# Patient Record
Sex: FEMALE | Race: WHITE | NOT HISPANIC OR LATINO
[De-identification: names, ages, dates, MRNs, and addresses within clinical notes are randomized per-mention and may not be internally consistent; named-entity substitution may affect disease eponyms.]

---

## 2019-04-11 PROBLEM — Z00.00 ENCOUNTER FOR PREVENTIVE HEALTH EXAMINATION: Status: ACTIVE | Noted: 2019-04-11

## 2019-04-12 ENCOUNTER — APPOINTMENT (OUTPATIENT)
Dept: MRI IMAGING | Facility: CLINIC | Age: 84
End: 2019-04-12
Payer: MEDICARE

## 2019-04-12 ENCOUNTER — OUTPATIENT (OUTPATIENT)
Dept: OUTPATIENT SERVICES | Facility: HOSPITAL | Age: 84
LOS: 1 days | End: 2019-04-12
Payer: MEDICARE

## 2019-04-12 DIAGNOSIS — Z00.8 ENCOUNTER FOR OTHER GENERAL EXAMINATION: ICD-10-CM

## 2019-04-12 PROCEDURE — 72148 MRI LUMBAR SPINE W/O DYE: CPT

## 2019-04-12 PROCEDURE — 72148 MRI LUMBAR SPINE W/O DYE: CPT | Mod: 26

## 2019-05-22 ENCOUNTER — APPOINTMENT (OUTPATIENT)
Dept: MRI IMAGING | Facility: CLINIC | Age: 84
End: 2019-05-22

## 2019-05-22 ENCOUNTER — OUTPATIENT (OUTPATIENT)
Dept: OUTPATIENT SERVICES | Facility: HOSPITAL | Age: 84
LOS: 1 days | End: 2019-05-22
Payer: MEDICARE

## 2019-05-22 DIAGNOSIS — Z00.00 ENCOUNTER FOR GENERAL ADULT MEDICAL EXAMINATION WITHOUT ABNORMAL FINDINGS: ICD-10-CM

## 2019-05-22 PROCEDURE — 72141 MRI NECK SPINE W/O DYE: CPT

## 2019-05-22 PROCEDURE — 72141 MRI NECK SPINE W/O DYE: CPT | Mod: 26

## 2021-01-08 ENCOUNTER — EMERGENCY (EMERGENCY)
Facility: HOSPITAL | Age: 86
LOS: 1 days | Discharge: HOME | End: 2021-01-08
Attending: EMERGENCY MEDICINE
Payer: MEDICARE

## 2021-01-08 VITALS
OXYGEN SATURATION: 99 % | RESPIRATION RATE: 18 BRPM | HEIGHT: 65 IN | SYSTOLIC BLOOD PRESSURE: 144 MMHG | TEMPERATURE: 98 F | WEIGHT: 149.91 LBS | HEART RATE: 106 BPM | DIASTOLIC BLOOD PRESSURE: 55 MMHG

## 2021-01-08 VITALS
TEMPERATURE: 98 F | RESPIRATION RATE: 18 BRPM | HEART RATE: 80 BPM | SYSTOLIC BLOOD PRESSURE: 137 MMHG | DIASTOLIC BLOOD PRESSURE: 67 MMHG | OXYGEN SATURATION: 98 %

## 2021-01-08 LAB
ALBUMIN SERPL ELPH-MCNC: 4 G/DL — SIGNIFICANT CHANGE UP (ref 3.3–5.2)
ALP SERPL-CCNC: 88 U/L — SIGNIFICANT CHANGE UP (ref 40–120)
ALT FLD-CCNC: 18 U/L — SIGNIFICANT CHANGE UP
ANION GAP SERPL CALC-SCNC: 12 MMOL/L — SIGNIFICANT CHANGE UP (ref 5–17)
AST SERPL-CCNC: 24 U/L — SIGNIFICANT CHANGE UP
BASOPHILS # BLD AUTO: 0.08 K/UL — SIGNIFICANT CHANGE UP (ref 0–0.2)
BASOPHILS NFR BLD AUTO: 1 % — SIGNIFICANT CHANGE UP (ref 0–2)
BILIRUB SERPL-MCNC: 0.8 MG/DL — SIGNIFICANT CHANGE UP (ref 0.4–2)
BUN SERPL-MCNC: 15 MG/DL — SIGNIFICANT CHANGE UP (ref 8–20)
CALCIUM SERPL-MCNC: 10.8 MG/DL — HIGH (ref 8.6–10.2)
CHLORIDE SERPL-SCNC: 102 MMOL/L — SIGNIFICANT CHANGE UP (ref 98–107)
CK SERPL-CCNC: 106 U/L — SIGNIFICANT CHANGE UP (ref 25–170)
CO2 SERPL-SCNC: 28 MMOL/L — SIGNIFICANT CHANGE UP (ref 22–29)
CREAT SERPL-MCNC: 1 MG/DL — SIGNIFICANT CHANGE UP (ref 0.5–1.3)
EOSINOPHIL # BLD AUTO: 0.49 K/UL — SIGNIFICANT CHANGE UP (ref 0–0.5)
EOSINOPHIL NFR BLD AUTO: 6.1 % — HIGH (ref 0–6)
GLUCOSE SERPL-MCNC: 114 MG/DL — HIGH (ref 70–99)
HCT VFR BLD CALC: 41.8 % — SIGNIFICANT CHANGE UP (ref 34.5–45)
HGB BLD-MCNC: 13.6 G/DL — SIGNIFICANT CHANGE UP (ref 11.5–15.5)
IMM GRANULOCYTES NFR BLD AUTO: 0.1 % — SIGNIFICANT CHANGE UP (ref 0–1.5)
LIDOCAIN IGE QN: 35 U/L — SIGNIFICANT CHANGE UP (ref 22–51)
LYMPHOCYTES # BLD AUTO: 2.06 K/UL — SIGNIFICANT CHANGE UP (ref 1–3.3)
LYMPHOCYTES # BLD AUTO: 25.8 % — SIGNIFICANT CHANGE UP (ref 13–44)
MCHC RBC-ENTMCNC: 30.2 PG — SIGNIFICANT CHANGE UP (ref 27–34)
MCHC RBC-ENTMCNC: 32.5 GM/DL — SIGNIFICANT CHANGE UP (ref 32–36)
MCV RBC AUTO: 92.9 FL — SIGNIFICANT CHANGE UP (ref 80–100)
MONOCYTES # BLD AUTO: 0.55 K/UL — SIGNIFICANT CHANGE UP (ref 0–0.9)
MONOCYTES NFR BLD AUTO: 6.9 % — SIGNIFICANT CHANGE UP (ref 2–14)
NEUTROPHILS # BLD AUTO: 4.78 K/UL — SIGNIFICANT CHANGE UP (ref 1.8–7.4)
NEUTROPHILS NFR BLD AUTO: 60.1 % — SIGNIFICANT CHANGE UP (ref 43–77)
PLATELET # BLD AUTO: 245 K/UL — SIGNIFICANT CHANGE UP (ref 150–400)
POTASSIUM SERPL-MCNC: 4 MMOL/L — SIGNIFICANT CHANGE UP (ref 3.5–5.3)
POTASSIUM SERPL-SCNC: 4 MMOL/L — SIGNIFICANT CHANGE UP (ref 3.5–5.3)
PROT SERPL-MCNC: 7.4 G/DL — SIGNIFICANT CHANGE UP (ref 6.6–8.7)
RBC # BLD: 4.5 M/UL — SIGNIFICANT CHANGE UP (ref 3.8–5.2)
RBC # FLD: 12.6 % — SIGNIFICANT CHANGE UP (ref 10.3–14.5)
SARS-COV-2 RNA SPEC QL NAA+PROBE: SIGNIFICANT CHANGE UP
SODIUM SERPL-SCNC: 142 MMOL/L — SIGNIFICANT CHANGE UP (ref 135–145)
TROPONIN T SERPL-MCNC: <0.01 NG/ML — SIGNIFICANT CHANGE UP (ref 0–0.06)
TROPONIN T SERPL-MCNC: <0.01 NG/ML — SIGNIFICANT CHANGE UP (ref 0–0.06)
WBC # BLD: 7.97 K/UL — SIGNIFICANT CHANGE UP (ref 3.8–10.5)
WBC # FLD AUTO: 7.97 K/UL — SIGNIFICANT CHANGE UP (ref 3.8–10.5)

## 2021-01-08 PROCEDURE — 83690 ASSAY OF LIPASE: CPT

## 2021-01-08 PROCEDURE — 99285 EMERGENCY DEPT VISIT HI MDM: CPT

## 2021-01-08 PROCEDURE — 93005 ELECTROCARDIOGRAM TRACING: CPT

## 2021-01-08 PROCEDURE — 96375 TX/PRO/DX INJ NEW DRUG ADDON: CPT

## 2021-01-08 PROCEDURE — 82550 ASSAY OF CK (CPK): CPT

## 2021-01-08 PROCEDURE — U0003: CPT

## 2021-01-08 PROCEDURE — U0005: CPT

## 2021-01-08 PROCEDURE — 99284 EMERGENCY DEPT VISIT MOD MDM: CPT | Mod: 25

## 2021-01-08 PROCEDURE — 80053 COMPREHEN METABOLIC PANEL: CPT

## 2021-01-08 PROCEDURE — 96374 THER/PROPH/DIAG INJ IV PUSH: CPT

## 2021-01-08 PROCEDURE — 36415 COLL VENOUS BLD VENIPUNCTURE: CPT

## 2021-01-08 PROCEDURE — 71045 X-RAY EXAM CHEST 1 VIEW: CPT

## 2021-01-08 PROCEDURE — 93010 ELECTROCARDIOGRAM REPORT: CPT

## 2021-01-08 PROCEDURE — 84484 ASSAY OF TROPONIN QUANT: CPT

## 2021-01-08 PROCEDURE — 71045 X-RAY EXAM CHEST 1 VIEW: CPT | Mod: 26

## 2021-01-08 PROCEDURE — 85025 COMPLETE CBC W/AUTO DIFF WBC: CPT

## 2021-01-08 RX ORDER — ONDANSETRON 8 MG/1
4 TABLET, FILM COATED ORAL ONCE
Refills: 0 | Status: COMPLETED | OUTPATIENT
Start: 2021-01-08 | End: 2021-01-08

## 2021-01-08 RX ORDER — SUCRALFATE 1 G
1 TABLET ORAL
Qty: 120 | Refills: 0
Start: 2021-01-08 | End: 2021-02-06

## 2021-01-08 RX ORDER — PANTOPRAZOLE SODIUM 20 MG/1
1 TABLET, DELAYED RELEASE ORAL
Qty: 30 | Refills: 0
Start: 2021-01-08 | End: 2021-02-06

## 2021-01-08 RX ORDER — FAMOTIDINE 10 MG/ML
20 INJECTION INTRAVENOUS ONCE
Refills: 0 | Status: COMPLETED | OUTPATIENT
Start: 2021-01-08 | End: 2021-01-08

## 2021-01-08 RX ADMIN — Medication 30 MILLILITER(S): at 13:00

## 2021-01-08 RX ADMIN — FAMOTIDINE 20 MILLIGRAM(S): 10 INJECTION INTRAVENOUS at 13:00

## 2021-01-08 RX ADMIN — ONDANSETRON 4 MILLIGRAM(S): 8 TABLET, FILM COATED ORAL at 13:00

## 2021-01-08 NOTE — ED PROVIDER NOTE - CLINICAL SUMMARY MEDICAL DECISION MAKING FREE TEXT BOX
pt has epigastric pain for 3 days, will treat as gastritis, r/o acs, pna with 1 set of trop, cxr and reassess

## 2021-01-08 NOTE — ED PROVIDER NOTE - OBJECTIVE STATEMENT
86 y/o F with h/o hypothytroidism, gerd, anxiety p/w epigastric pain for 3 days which is burning in nature is very similar to her prior gerd episodes but persisten. Her GI was not available so she went to urgent care and was sent to ED for eval. No cough, fever, chills, mausea, vomiting, diarrhea, sob

## 2021-01-08 NOTE — ED PROVIDER NOTE - PROGRESS NOTE DETAILS
pt felt better, 2 set of neg trop likely gerd, pt currrently no on my maed for gerd per her med list, will start on pantocid

## 2021-01-08 NOTE — ED PROVIDER NOTE - PATIENT PORTAL LINK FT
You can access the FollowMyHealth Patient Portal offered by [facility] by registering at the following website: [email or web address]. By joining [employer]’s FollowMyHealth portal, you will also be able to view your health information using other applications (apps) compatible with our system.

## 2021-01-08 NOTE — ED ADULT NURSE NOTE - OBJECTIVE STATEMENT
pt A&Ox4, sent from urgent care to r/o MI, c/o burning in chest similar to her acid reflux just worsen x4days pt taking TUMS with no relief, resp even and unlabored no distress noted, denies numbness/tingling, sob, cough, fever/chills, ha, dizziness, abd pain n/v/d

## 2021-01-08 NOTE — ED PROVIDER NOTE - NSFOLLOWUPINSTRUCTIONS_ED_ALL_ED_FT
1. take meds as prescribed  2. avoid spicy, fried food, tea, cofee  2. follow up with your GI  4. DON'T LAY DOWN RIGHT AFTER eating for 30 minutes  5. return promptly in c/o worsening symptoms

## 2021-01-09 RX ORDER — ATORVASTATIN CALCIUM 80 MG/1
1 TABLET, FILM COATED ORAL
Qty: 0 | Refills: 0 | DISCHARGE

## 2021-01-09 RX ORDER — LEVOTHYROXINE SODIUM 125 MCG
1 TABLET ORAL
Qty: 0 | Refills: 0 | DISCHARGE

## 2021-01-09 RX ORDER — PANTOPRAZOLE SODIUM 20 MG/1
1 TABLET, DELAYED RELEASE ORAL
Qty: 0 | Refills: 0 | DISCHARGE

## 2021-01-09 RX ORDER — VENLAFAXINE HCL 75 MG
1 CAPSULE, EXT RELEASE 24 HR ORAL
Qty: 0 | Refills: 0 | DISCHARGE

## 2021-11-07 ENCOUNTER — EMERGENCY (EMERGENCY)
Facility: HOSPITAL | Age: 86
LOS: 1 days | Discharge: HOME | End: 2021-11-07
Attending: EMERGENCY MEDICINE
Payer: MEDICARE

## 2021-11-07 VITALS
RESPIRATION RATE: 20 BRPM | DIASTOLIC BLOOD PRESSURE: 93 MMHG | OXYGEN SATURATION: 99 % | SYSTOLIC BLOOD PRESSURE: 168 MMHG | HEART RATE: 81 BPM

## 2021-11-07 VITALS
DIASTOLIC BLOOD PRESSURE: 88 MMHG | HEIGHT: 65 IN | OXYGEN SATURATION: 95 % | RESPIRATION RATE: 16 BRPM | HEART RATE: 93 BPM | WEIGHT: 141.98 LBS | SYSTOLIC BLOOD PRESSURE: 188 MMHG | TEMPERATURE: 99 F

## 2021-11-07 PROBLEM — E03.9 HYPOTHYROIDISM, UNSPECIFIED: Chronic | Status: ACTIVE | Noted: 2021-01-08

## 2021-11-07 PROBLEM — F32.9 MAJOR DEPRESSIVE DISORDER, SINGLE EPISODE, UNSPECIFIED: Chronic | Status: ACTIVE | Noted: 2021-01-08

## 2021-11-07 PROBLEM — E78.00 PURE HYPERCHOLESTEROLEMIA, UNSPECIFIED: Chronic | Status: ACTIVE | Noted: 2021-01-08

## 2021-11-07 LAB
ALBUMIN SERPL ELPH-MCNC: 4 G/DL — SIGNIFICANT CHANGE UP (ref 3.3–5.2)
ALP SERPL-CCNC: 96 U/L — SIGNIFICANT CHANGE UP (ref 40–120)
ALT FLD-CCNC: 26 U/L — SIGNIFICANT CHANGE UP
ANION GAP SERPL CALC-SCNC: 10 MMOL/L — SIGNIFICANT CHANGE UP (ref 5–17)
AST SERPL-CCNC: 28 U/L — SIGNIFICANT CHANGE UP
BASOPHILS # BLD AUTO: 0.04 K/UL — SIGNIFICANT CHANGE UP (ref 0–0.2)
BASOPHILS NFR BLD AUTO: 0.8 % — SIGNIFICANT CHANGE UP (ref 0–2)
BILIRUB SERPL-MCNC: 0.6 MG/DL — SIGNIFICANT CHANGE UP (ref 0.4–2)
BUN SERPL-MCNC: 9.4 MG/DL — SIGNIFICANT CHANGE UP (ref 8–20)
CALCIUM SERPL-MCNC: 8.3 MG/DL — LOW (ref 8.6–10.2)
CHLORIDE SERPL-SCNC: 104 MMOL/L — SIGNIFICANT CHANGE UP (ref 98–107)
CO2 SERPL-SCNC: 27 MMOL/L — SIGNIFICANT CHANGE UP (ref 22–29)
CREAT SERPL-MCNC: 0.82 MG/DL — SIGNIFICANT CHANGE UP (ref 0.5–1.3)
EOSINOPHIL # BLD AUTO: 0.08 K/UL — SIGNIFICANT CHANGE UP (ref 0–0.5)
EOSINOPHIL NFR BLD AUTO: 1.5 % — SIGNIFICANT CHANGE UP (ref 0–6)
GLUCOSE SERPL-MCNC: 106 MG/DL — HIGH (ref 70–99)
HCT VFR BLD CALC: 41.4 % — SIGNIFICANT CHANGE UP (ref 34.5–45)
HGB BLD-MCNC: 13.3 G/DL — SIGNIFICANT CHANGE UP (ref 11.5–15.5)
HPIV2 RNA SPEC QL NAA+PROBE: DETECTED
IMM GRANULOCYTES NFR BLD AUTO: 0.4 % — SIGNIFICANT CHANGE UP (ref 0–1.5)
LYMPHOCYTES # BLD AUTO: 1.47 K/UL — SIGNIFICANT CHANGE UP (ref 1–3.3)
LYMPHOCYTES # BLD AUTO: 27.9 % — SIGNIFICANT CHANGE UP (ref 13–44)
MCHC RBC-ENTMCNC: 29.4 PG — SIGNIFICANT CHANGE UP (ref 27–34)
MCHC RBC-ENTMCNC: 32.1 GM/DL — SIGNIFICANT CHANGE UP (ref 32–36)
MCV RBC AUTO: 91.6 FL — SIGNIFICANT CHANGE UP (ref 80–100)
MONOCYTES # BLD AUTO: 0.31 K/UL — SIGNIFICANT CHANGE UP (ref 0–0.9)
MONOCYTES NFR BLD AUTO: 5.9 % — SIGNIFICANT CHANGE UP (ref 2–14)
NEUTROPHILS # BLD AUTO: 3.35 K/UL — SIGNIFICANT CHANGE UP (ref 1.8–7.4)
NEUTROPHILS NFR BLD AUTO: 63.5 % — SIGNIFICANT CHANGE UP (ref 43–77)
PLATELET # BLD AUTO: 233 K/UL — SIGNIFICANT CHANGE UP (ref 150–400)
POTASSIUM SERPL-MCNC: 4.2 MMOL/L — SIGNIFICANT CHANGE UP (ref 3.5–5.3)
POTASSIUM SERPL-SCNC: 4.2 MMOL/L — SIGNIFICANT CHANGE UP (ref 3.5–5.3)
PROT SERPL-MCNC: 7.5 G/DL — SIGNIFICANT CHANGE UP (ref 6.6–8.7)
RAPID RVP RESULT: DETECTED
RBC # BLD: 4.52 M/UL — SIGNIFICANT CHANGE UP (ref 3.8–5.2)
RBC # FLD: 12.2 % — SIGNIFICANT CHANGE UP (ref 10.3–14.5)
SARS-COV-2 RNA SPEC QL NAA+PROBE: SIGNIFICANT CHANGE UP
SODIUM SERPL-SCNC: 141 MMOL/L — SIGNIFICANT CHANGE UP (ref 135–145)
TROPONIN T SERPL-MCNC: <0.01 NG/ML — SIGNIFICANT CHANGE UP (ref 0–0.06)
WBC # BLD: 5.27 K/UL — SIGNIFICANT CHANGE UP (ref 3.8–10.5)
WBC # FLD AUTO: 5.27 K/UL — SIGNIFICANT CHANGE UP (ref 3.8–10.5)

## 2021-11-07 PROCEDURE — 0225U NFCT DS DNA&RNA 21 SARSCOV2: CPT

## 2021-11-07 PROCEDURE — 80053 COMPREHEN METABOLIC PANEL: CPT

## 2021-11-07 PROCEDURE — 99283 EMERGENCY DEPT VISIT LOW MDM: CPT | Mod: 25

## 2021-11-07 PROCEDURE — 71045 X-RAY EXAM CHEST 1 VIEW: CPT | Mod: 26

## 2021-11-07 PROCEDURE — 36415 COLL VENOUS BLD VENIPUNCTURE: CPT

## 2021-11-07 PROCEDURE — 85025 COMPLETE CBC W/AUTO DIFF WBC: CPT

## 2021-11-07 PROCEDURE — 99285 EMERGENCY DEPT VISIT HI MDM: CPT | Mod: CS

## 2021-11-07 PROCEDURE — 84484 ASSAY OF TROPONIN QUANT: CPT

## 2021-11-07 PROCEDURE — 71045 X-RAY EXAM CHEST 1 VIEW: CPT

## 2021-11-07 PROCEDURE — 93005 ELECTROCARDIOGRAM TRACING: CPT

## 2021-11-07 PROCEDURE — 93010 ELECTROCARDIOGRAM REPORT: CPT
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
## 2021-11-07 NOTE — ED PROVIDER NOTE - CLINICAL SUMMARY MEDICAL DECISION MAKING FREE TEXT BOX
85y F presents from Urgent Care with "cold symptoms" (chest congestion, rhinorrhea, & hoarse voice) x2 weeks after receiving flu shot. Urgent Care sent her in d/t presence of Q wave in lead III & HTN to 170s/70s. Pt is well-appearing with VSS & BP trending down with no intervention (now 160s/90s), denies H/A, dizziness/lightheadedness, vision changes, CP, or SOB. Given the above, very low suspicion for hypertensive emergency, so no need to acutely lower BP at this time, but will obtain troponin given her age & h/o HLD. In the absence of CP, unilateral lower extremity edema, temperature/color discrepancy, & tenderness, sinus tachycardia or other predictive EKG patterns of PE, & hypoxia on RA, no need for d-dimer or IV contrast enhanced CTA of the chest at this time. Will obtain CXR to evaluate for possible viral or bacterial PNA in the setting of URI & will obtain RVP with COVID to assess for respiratory viruses. Will obtain CBC & CMP to assess for signs of infection or electrolyte imbalance in the setting of 10 "small, soft BMs" today.

## 2021-11-07 NOTE — ED PROVIDER NOTE - OBJECTIVE STATEMENT
85y F PMHx chronic back pain, hypothyroidism, anxiety, & HLD sent in from Urgent Care with abnormal EKG & HTN. Pt reports nausea & diarrhea (10 soft, non-bloody, small BMs today) since this AM with no vomiting. Went to Urgent Care today with "cold symptoms" (congestion, rhinorrhea, & hoarse voice) x2 weeks, onset after receiving flu shot ~2.5 weeks ago. Urgent care sent pt to ED with possible Q wave in lead III & /75. Pt had negative rapid COVID & flu A&B tests at Urgent Care today. Of note, pt had COVID booster dose ~1 week ago. Denies h/o HTN, H/A, vision changes, dizziness/lightheadedness, CP, & SOB. 85y F PMHx chronic back pain, hypothyroidism, anxiety, & HLD sent in from Urgent Care with abnormal EKG & HTN. Pt reports nausea & diarrhea (10 soft, non-bloody, small BMs today) since this AM with no vomiting. Went to Urgent Care today with "cold symptoms" (congestion, rhinorrhea, & hoarse voice) x2 weeks, onset after receiving flu shot ~2.5 weeks ago. Urgent care sent pt to ED with possible Q wave in lead III & /75. Pt had negative rapid COVID & flu A&B tests at Urgent Care today. Of note, pt had COVID booster dose ~1 week ago. Denies h/o HTN, H/A, vision changes, dizziness/lightheadedness, CP, & SOB. EKG from Urgent Care compared with EKG from January 2021 that also shows Q wave in lead III & EKG obtained on presentation to [facility] shows no Q wave in lead III.

## 2021-11-07 NOTE — ED PROVIDER NOTE - PROGRESS NOTE DETAILS
labs and x-ray report discussed with patient. Pt. is asymptomatic. BP improving. Pt. stable for discharge. I spoke to the patient again in regards to her elevated BP. Pt. will need to follow up with her PMD to have her BP re-checked. NO medication will be given at this time for her BP since pt. is asymptomatic (No chest pain or SOB or abdominal pain or headache).

## 2021-11-07 NOTE — ED PROVIDER NOTE - ATTENDING CONTRIBUTION TO CARE
Pt. awake and alert. No acute distress. Heart-RRR Lungs-CTA b/l. Abdomen soft/NT. No leg edema. I, Dr. Rocha, performed a face to face bedside interview with this patient regarding history of present illness, review of symptoms and relevant past medical, social and family history.  I completed an independent physical examination.  I have also reviewed the student's note(s) and discussed the plan with the student.

## 2021-11-07 NOTE — ED ADULT TRIAGE NOTE - CHIEF COMPLAINT QUOTE
sent from urgent care d/t htn pt states she is having URI symptoms for two weeks, just recently had booster for covid and flu shot and is experiencing diarrhea.  sent with abnormal ekg but per son that is her normal ekg, son is radiology for [employer].

## 2021-11-07 NOTE — ED PROVIDER NOTE - PATIENT PORTAL LINK FT
You can access the FollowMyHealth Patient Portal offered by [facility] by registering at the following website: [email or web address]. By joining [employer]’s FollowMyHealth portal, you will also be able to view your health information using other applications (apps) compatible with our system.

## 2021-11-09 ENCOUNTER — EMERGENCY (EMERGENCY)
Facility: HOSPITAL | Age: 86
LOS: 1 days | Discharge: HOME | End: 2021-11-09
Attending: EMERGENCY MEDICINE
Payer: MEDICARE

## 2021-11-09 VITALS
RESPIRATION RATE: 22 BRPM | SYSTOLIC BLOOD PRESSURE: 176 MMHG | WEIGHT: 149.91 LBS | TEMPERATURE: 98 F | DIASTOLIC BLOOD PRESSURE: 96 MMHG | OXYGEN SATURATION: 94 % | HEIGHT: 65 IN | HEART RATE: 102 BPM

## 2021-11-09 VITALS — DIASTOLIC BLOOD PRESSURE: 80 MMHG | HEART RATE: 74 BPM | SYSTOLIC BLOOD PRESSURE: 153 MMHG

## 2021-11-09 LAB
ALBUMIN SERPL ELPH-MCNC: 4 G/DL — SIGNIFICANT CHANGE UP (ref 3.3–5.2)
ALP SERPL-CCNC: 86 U/L — SIGNIFICANT CHANGE UP (ref 40–120)
ALT FLD-CCNC: 26 U/L — SIGNIFICANT CHANGE UP
ANION GAP SERPL CALC-SCNC: 12 MMOL/L — SIGNIFICANT CHANGE UP (ref 5–17)
APPEARANCE UR: ABNORMAL
AST SERPL-CCNC: 41 U/L — HIGH
BACTERIA # UR AUTO: ABNORMAL
BASOPHILS # BLD AUTO: 0.07 K/UL — SIGNIFICANT CHANGE UP (ref 0–0.2)
BASOPHILS NFR BLD AUTO: 1.2 % — SIGNIFICANT CHANGE UP (ref 0–2)
BILIRUB SERPL-MCNC: 0.7 MG/DL — SIGNIFICANT CHANGE UP (ref 0.4–2)
BILIRUB UR-MCNC: NEGATIVE — SIGNIFICANT CHANGE UP
BUN SERPL-MCNC: 13.2 MG/DL — SIGNIFICANT CHANGE UP (ref 8–20)
CALCIUM SERPL-MCNC: 8.8 MG/DL — SIGNIFICANT CHANGE UP (ref 8.6–10.2)
CHLORIDE SERPL-SCNC: 100 MMOL/L — SIGNIFICANT CHANGE UP (ref 98–107)
CO2 SERPL-SCNC: 27 MMOL/L — SIGNIFICANT CHANGE UP (ref 22–29)
COLOR SPEC: YELLOW — SIGNIFICANT CHANGE UP
CREAT SERPL-MCNC: 0.82 MG/DL — SIGNIFICANT CHANGE UP (ref 0.5–1.3)
DIFF PNL FLD: NEGATIVE — SIGNIFICANT CHANGE UP
EOSINOPHIL # BLD AUTO: 0.09 K/UL — SIGNIFICANT CHANGE UP (ref 0–0.5)
EOSINOPHIL NFR BLD AUTO: 1.5 % — SIGNIFICANT CHANGE UP (ref 0–6)
EPI CELLS # UR: SIGNIFICANT CHANGE UP
GLUCOSE SERPL-MCNC: 115 MG/DL — HIGH (ref 70–99)
GLUCOSE UR QL: NEGATIVE MG/DL — SIGNIFICANT CHANGE UP
HCT VFR BLD CALC: 40.5 % — SIGNIFICANT CHANGE UP (ref 34.5–45)
HGB BLD-MCNC: 13.4 G/DL — SIGNIFICANT CHANGE UP (ref 11.5–15.5)
IMM GRANULOCYTES NFR BLD AUTO: 0.3 % — SIGNIFICANT CHANGE UP (ref 0–1.5)
KETONES UR-MCNC: NEGATIVE — SIGNIFICANT CHANGE UP
LEUKOCYTE ESTERASE UR-ACNC: NEGATIVE — SIGNIFICANT CHANGE UP
LYMPHOCYTES # BLD AUTO: 1.86 K/UL — SIGNIFICANT CHANGE UP (ref 1–3.3)
LYMPHOCYTES # BLD AUTO: 31.8 % — SIGNIFICANT CHANGE UP (ref 13–44)
MCHC RBC-ENTMCNC: 30.1 PG — SIGNIFICANT CHANGE UP (ref 27–34)
MCHC RBC-ENTMCNC: 33.1 GM/DL — SIGNIFICANT CHANGE UP (ref 32–36)
MCV RBC AUTO: 91 FL — SIGNIFICANT CHANGE UP (ref 80–100)
MONOCYTES # BLD AUTO: 0.48 K/UL — SIGNIFICANT CHANGE UP (ref 0–0.9)
MONOCYTES NFR BLD AUTO: 8.2 % — SIGNIFICANT CHANGE UP (ref 2–14)
NEUTROPHILS # BLD AUTO: 3.33 K/UL — SIGNIFICANT CHANGE UP (ref 1.8–7.4)
NEUTROPHILS NFR BLD AUTO: 57 % — SIGNIFICANT CHANGE UP (ref 43–77)
NITRITE UR-MCNC: NEGATIVE — SIGNIFICANT CHANGE UP
PH UR: 6.5 — SIGNIFICANT CHANGE UP (ref 5–8)
PLATELET # BLD AUTO: 235 K/UL — SIGNIFICANT CHANGE UP (ref 150–400)
POTASSIUM SERPL-MCNC: 4.4 MMOL/L — SIGNIFICANT CHANGE UP (ref 3.5–5.3)
POTASSIUM SERPL-SCNC: 4.4 MMOL/L — SIGNIFICANT CHANGE UP (ref 3.5–5.3)
PROT SERPL-MCNC: 7.4 G/DL — SIGNIFICANT CHANGE UP (ref 6.6–8.7)
PROT UR-MCNC: 15 MG/DL
RBC # BLD: 4.45 M/UL — SIGNIFICANT CHANGE UP (ref 3.8–5.2)
RBC # FLD: 12.6 % — SIGNIFICANT CHANGE UP (ref 10.3–14.5)
RBC CASTS # UR COMP ASSIST: NEGATIVE /HPF — SIGNIFICANT CHANGE UP (ref 0–4)
SODIUM SERPL-SCNC: 139 MMOL/L — SIGNIFICANT CHANGE UP (ref 135–145)
SP GR SPEC: 1.01 — SIGNIFICANT CHANGE UP (ref 1.01–1.02)
UROBILINOGEN FLD QL: NEGATIVE MG/DL — SIGNIFICANT CHANGE UP
WBC # BLD: 5.85 K/UL — SIGNIFICANT CHANGE UP (ref 3.8–10.5)
WBC # FLD AUTO: 5.85 K/UL — SIGNIFICANT CHANGE UP (ref 3.8–10.5)
WBC UR QL: SIGNIFICANT CHANGE UP

## 2021-11-09 PROCEDURE — 36415 COLL VENOUS BLD VENIPUNCTURE: CPT

## 2021-11-09 PROCEDURE — 71045 X-RAY EXAM CHEST 1 VIEW: CPT

## 2021-11-09 PROCEDURE — 99284 EMERGENCY DEPT VISIT MOD MDM: CPT | Mod: 25

## 2021-11-09 PROCEDURE — 81001 URINALYSIS AUTO W/SCOPE: CPT

## 2021-11-09 PROCEDURE — 93005 ELECTROCARDIOGRAM TRACING: CPT

## 2021-11-09 PROCEDURE — 80053 COMPREHEN METABOLIC PANEL: CPT

## 2021-11-09 PROCEDURE — 93010 ELECTROCARDIOGRAM REPORT: CPT

## 2021-11-09 PROCEDURE — 87086 URINE CULTURE/COLONY COUNT: CPT

## 2021-11-09 PROCEDURE — 84484 ASSAY OF TROPONIN QUANT: CPT

## 2021-11-09 PROCEDURE — 99285 EMERGENCY DEPT VISIT HI MDM: CPT | Mod: GC

## 2021-11-09 PROCEDURE — 96374 THER/PROPH/DIAG INJ IV PUSH: CPT

## 2021-11-09 PROCEDURE — 85025 COMPLETE CBC W/AUTO DIFF WBC: CPT

## 2021-11-09 PROCEDURE — 71045 X-RAY EXAM CHEST 1 VIEW: CPT | Mod: 26

## 2021-11-09 RX ORDER — ACETAMINOPHEN 500 MG
975 TABLET ORAL ONCE
Refills: 0 | Status: COMPLETED | OUTPATIENT
Start: 2021-11-09 | End: 2021-11-09

## 2021-11-09 RX ORDER — ONDANSETRON 8 MG/1
1 TABLET, FILM COATED ORAL
Qty: 9 | Refills: 0
Start: 2021-11-09 | End: 2021-11-11

## 2021-11-09 RX ORDER — ONDANSETRON 8 MG/1
4 TABLET, FILM COATED ORAL ONCE
Refills: 0 | Status: COMPLETED | OUTPATIENT
Start: 2021-11-09 | End: 2021-11-09

## 2021-11-09 RX ADMIN — Medication 975 MILLIGRAM(S): at 11:54

## 2021-11-09 RX ADMIN — ONDANSETRON 4 MILLIGRAM(S): 8 TABLET, FILM COATED ORAL at 10:30

## 2021-11-09 RX ADMIN — Medication 975 MILLIGRAM(S): at 12:03

## 2021-11-09 NOTE — ED PROVIDER NOTE - PROGRESS NOTE DETAILS
C[place]: Patient feels better after zofran, tolerating PO, shared results, comfortable with discharge, verbalizes understanding regarding indications to return.
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
## 2021-11-09 NOTE — ED PROVIDER NOTE - PATIENT PORTAL LINK FT
You can access the FollowMyHealth Patient Portal offered by [facility] by registering at the following website: [email or web address]. By joining [employer]’s FollowMyHealth portal, you will also be able to view your health information using other applications (apps) compatible with our system.

## 2021-11-09 NOTE — ED ADULT NURSE REASSESSMENT NOTE - NS ED NURSE REASSESS COMMENT FT1
assumed care for patient at 10:20am endorsed received from charge nurse , patient aox3, complain of nausea medication giving

## 2021-11-09 NOTE — ED PROVIDER NOTE - OBJECTIVE STATEMENT
84 yo female hx hypothyroidism presents w cough, sore throat, body aches, nausea since receiving the COVID booster shot two weeks ago. she states that her symptoms have been waxing and waning. She is still able to eat despite her nausea. Reports dry cough, denies sputum production. Patient was evaluated in the ED two days ago, her RVP was positive for Parainfluenza. Denies fever, chest pain, SOB, abd pain, emesis, diarrhea, melena, bloody stools, urinary symptoms, dizziness, headache.

## 2021-11-09 NOTE — ED PROVIDER NOTE - CARE PROVIDER_API CALL
Rodriguez Gann)  [employer] [place] [address] [place] [address]  Phone: [phone]  Fax: [phone]  Follow Up Time:

## 2021-11-09 NOTE — ED PROVIDER NOTE - NSDCPRINTRESULTS_ED_ALL_ED
[Dear  ___] : Dear  [unfilled], [FreeTextEntry2] : Zoltan Meza MD\par \par [address] [place] [address], \par [place], [place]  Patient requests all Lab, Cardiology, and Radiology Results on their Discharge Instructions

## 2021-11-09 NOTE — ED PROVIDER NOTE - CLINICAL SUMMARY MEDICAL DECISION MAKING FREE TEXT BOX
84 yo female parainfluenza positive on RVP two days ago w continued symptoms. Will check basic labs, EKG, CXR. Zofran for symptom control. Monitor and reassess.

## 2021-11-09 NOTE — ED ADULT TRIAGE NOTE - CHIEF COMPLAINT QUOTE
nausea, weakness, sweating for 2 weeks. pt states she got evaluated here and her MD office but not getting better, MD office requesting troponin and dimer, denies chest pain currently

## 2021-11-09 NOTE — ED PROVIDER NOTE - ATTENDING CONTRIBUTION TO CARE
I personally saw the patient with the resident, and completed the key components of the history and physical exam. I then discussed the management plan with the resident.    84 y/o F with PMH hypothyroidism, HLD, depression presents for fatigue, malaise, nausea and body aches x 2 weeks, was here 2 days ago for the same thing, diagnosed with para-influenza, but still not feeling herself. Denies fevers, chest pain, SOB, abdominal pain. She states she is having episodes of diaphoresis as well. Last stress test was 2-3 years ago ([facility]).     AP - NAD, well appearing, RRR, lungs clear, abd soft NT/ND, not diaphoretic, no LE edema, 2+ symmetrical pulses.    Will check labs, EKG, UA, CXR and reassess.

## 2021-11-10 LAB
CULTURE RESULTS: SIGNIFICANT CHANGE UP
SPECIMEN SOURCE: SIGNIFICANT CHANGE UP

## 2021-11-12 NOTE — ED POST DISCHARGE NOTE - ADDITIONAL DOCUMENTATION
Pt called back. Pt with no acute urinary complaints. I instructed patient to follow up with her doctor this week for repeat UA/Cx.

## 2022-01-28 ENCOUNTER — EMERGENCY (EMERGENCY)
Facility: HOSPITAL | Age: 87
LOS: 1 days | Discharge: HOME | End: 2022-01-28
Attending: EMERGENCY MEDICINE
Payer: MEDICARE

## 2022-01-28 VITALS
HEART RATE: 86 BPM | RESPIRATION RATE: 18 BRPM | TEMPERATURE: 98 F | OXYGEN SATURATION: 96 % | DIASTOLIC BLOOD PRESSURE: 82 MMHG | WEIGHT: 149.91 LBS | SYSTOLIC BLOOD PRESSURE: 189 MMHG | HEIGHT: 65 IN

## 2022-01-28 VITALS
RESPIRATION RATE: 18 BRPM | DIASTOLIC BLOOD PRESSURE: 75 MMHG | HEART RATE: 79 BPM | OXYGEN SATURATION: 97 % | SYSTOLIC BLOOD PRESSURE: 170 MMHG

## 2022-01-28 LAB
ALBUMIN SERPL ELPH-MCNC: 4.1 G/DL — SIGNIFICANT CHANGE UP (ref 3.3–5.2)
ALP SERPL-CCNC: 78 U/L — SIGNIFICANT CHANGE UP (ref 40–120)
ALT FLD-CCNC: 17 U/L — SIGNIFICANT CHANGE UP
ANION GAP SERPL CALC-SCNC: 11 MMOL/L — SIGNIFICANT CHANGE UP (ref 5–17)
APPEARANCE UR: CLEAR — SIGNIFICANT CHANGE UP
AST SERPL-CCNC: 25 U/L — SIGNIFICANT CHANGE UP
BACTERIA # UR AUTO: ABNORMAL
BASOPHILS # BLD AUTO: 0.06 K/UL — SIGNIFICANT CHANGE UP (ref 0–0.2)
BASOPHILS NFR BLD AUTO: 0.8 % — SIGNIFICANT CHANGE UP (ref 0–2)
BILIRUB SERPL-MCNC: 0.6 MG/DL — SIGNIFICANT CHANGE UP (ref 0.4–2)
BILIRUB UR-MCNC: NEGATIVE — SIGNIFICANT CHANGE UP
BUN SERPL-MCNC: 16.9 MG/DL — SIGNIFICANT CHANGE UP (ref 8–20)
CALCIUM SERPL-MCNC: 9.4 MG/DL — SIGNIFICANT CHANGE UP (ref 8.6–10.2)
CHLORIDE SERPL-SCNC: 102 MMOL/L — SIGNIFICANT CHANGE UP (ref 98–107)
CO2 SERPL-SCNC: 27 MMOL/L — SIGNIFICANT CHANGE UP (ref 22–29)
COLOR SPEC: YELLOW — SIGNIFICANT CHANGE UP
CREAT SERPL-MCNC: 0.83 MG/DL — SIGNIFICANT CHANGE UP (ref 0.5–1.3)
DIFF PNL FLD: NEGATIVE — SIGNIFICANT CHANGE UP
EOSINOPHIL # BLD AUTO: 0.4 K/UL — SIGNIFICANT CHANGE UP (ref 0–0.5)
EOSINOPHIL NFR BLD AUTO: 5.1 % — SIGNIFICANT CHANGE UP (ref 0–6)
EPI CELLS # UR: SIGNIFICANT CHANGE UP
GLUCOSE SERPL-MCNC: 102 MG/DL — HIGH (ref 70–99)
GLUCOSE UR QL: NEGATIVE MG/DL — SIGNIFICANT CHANGE UP
HCT VFR BLD CALC: 43 % — SIGNIFICANT CHANGE UP (ref 34.5–45)
HGB BLD-MCNC: 13.6 G/DL — SIGNIFICANT CHANGE UP (ref 11.5–15.5)
IMM GRANULOCYTES NFR BLD AUTO: 0.4 % — SIGNIFICANT CHANGE UP (ref 0–1.5)
KETONES UR-MCNC: ABNORMAL
LEUKOCYTE ESTERASE UR-ACNC: ABNORMAL
LYMPHOCYTES # BLD AUTO: 2.3 K/UL — SIGNIFICANT CHANGE UP (ref 1–3.3)
LYMPHOCYTES # BLD AUTO: 29.2 % — SIGNIFICANT CHANGE UP (ref 13–44)
MCHC RBC-ENTMCNC: 29.5 PG — SIGNIFICANT CHANGE UP (ref 27–34)
MCHC RBC-ENTMCNC: 31.6 GM/DL — LOW (ref 32–36)
MCV RBC AUTO: 93.3 FL — SIGNIFICANT CHANGE UP (ref 80–100)
MONOCYTES # BLD AUTO: 0.48 K/UL — SIGNIFICANT CHANGE UP (ref 0–0.9)
MONOCYTES NFR BLD AUTO: 6.1 % — SIGNIFICANT CHANGE UP (ref 2–14)
NEUTROPHILS # BLD AUTO: 4.61 K/UL — SIGNIFICANT CHANGE UP (ref 1.8–7.4)
NEUTROPHILS NFR BLD AUTO: 58.4 % — SIGNIFICANT CHANGE UP (ref 43–77)
NITRITE UR-MCNC: NEGATIVE — SIGNIFICANT CHANGE UP
PH UR: 6 — SIGNIFICANT CHANGE UP (ref 5–8)
PLATELET # BLD AUTO: 276 K/UL — SIGNIFICANT CHANGE UP (ref 150–400)
POTASSIUM SERPL-MCNC: 4.4 MMOL/L — SIGNIFICANT CHANGE UP (ref 3.5–5.3)
POTASSIUM SERPL-SCNC: 4.4 MMOL/L — SIGNIFICANT CHANGE UP (ref 3.5–5.3)
PROT SERPL-MCNC: 7.4 G/DL — SIGNIFICANT CHANGE UP (ref 6.6–8.7)
PROT UR-MCNC: 30 MG/DL
RBC # BLD: 4.61 M/UL — SIGNIFICANT CHANGE UP (ref 3.8–5.2)
RBC # FLD: 12.7 % — SIGNIFICANT CHANGE UP (ref 10.3–14.5)
RBC CASTS # UR COMP ASSIST: NEGATIVE /HPF — SIGNIFICANT CHANGE UP (ref 0–4)
SODIUM SERPL-SCNC: 140 MMOL/L — SIGNIFICANT CHANGE UP (ref 135–145)
SP GR SPEC: 1.02 — SIGNIFICANT CHANGE UP (ref 1.01–1.02)
UROBILINOGEN FLD QL: NEGATIVE MG/DL — SIGNIFICANT CHANGE UP
WBC # BLD: 7.88 K/UL — SIGNIFICANT CHANGE UP (ref 3.8–10.5)
WBC # FLD AUTO: 7.88 K/UL — SIGNIFICANT CHANGE UP (ref 3.8–10.5)
WBC UR QL: SIGNIFICANT CHANGE UP /HPF (ref 0–5)

## 2022-01-28 PROCEDURE — 71275 CT ANGIOGRAPHY CHEST: CPT | Mod: MA

## 2022-01-28 PROCEDURE — 96374 THER/PROPH/DIAG INJ IV PUSH: CPT | Mod: XU

## 2022-01-28 PROCEDURE — 36415 COLL VENOUS BLD VENIPUNCTURE: CPT

## 2022-01-28 PROCEDURE — 87086 URINE CULTURE/COLONY COUNT: CPT

## 2022-01-28 PROCEDURE — 93005 ELECTROCARDIOGRAM TRACING: CPT

## 2022-01-28 PROCEDURE — 99284 EMERGENCY DEPT VISIT MOD MDM: CPT | Mod: GC

## 2022-01-28 PROCEDURE — 99285 EMERGENCY DEPT VISIT HI MDM: CPT | Mod: 25

## 2022-01-28 PROCEDURE — 96375 TX/PRO/DX INJ NEW DRUG ADDON: CPT | Mod: XU

## 2022-01-28 PROCEDURE — 71275 CT ANGIOGRAPHY CHEST: CPT | Mod: 26,MA

## 2022-01-28 PROCEDURE — 74174 CTA ABD&PLVS W/CONTRAST: CPT | Mod: MA

## 2022-01-28 PROCEDURE — 81001 URINALYSIS AUTO W/SCOPE: CPT

## 2022-01-28 PROCEDURE — 93010 ELECTROCARDIOGRAM REPORT: CPT

## 2022-01-28 PROCEDURE — 80053 COMPREHEN METABOLIC PANEL: CPT

## 2022-01-28 PROCEDURE — 74174 CTA ABD&PLVS W/CONTRAST: CPT | Mod: 26,MA

## 2022-01-28 PROCEDURE — 85025 COMPLETE CBC W/AUTO DIFF WBC: CPT

## 2022-01-28 RX ORDER — MORPHINE SULFATE 50 MG/1
4 CAPSULE, EXTENDED RELEASE ORAL ONCE
Refills: 0 | Status: DISCONTINUED | OUTPATIENT
Start: 2022-01-28 | End: 2022-01-28

## 2022-01-28 RX ORDER — ONDANSETRON 8 MG/1
8 TABLET, FILM COATED ORAL ONCE
Refills: 0 | Status: COMPLETED | OUTPATIENT
Start: 2022-01-28 | End: 2022-01-28

## 2022-01-28 RX ORDER — SODIUM CHLORIDE 9 MG/ML
1000 INJECTION INTRAMUSCULAR; INTRAVENOUS; SUBCUTANEOUS ONCE
Refills: 0 | Status: COMPLETED | OUTPATIENT
Start: 2022-01-28 | End: 2022-01-28

## 2022-01-28 RX ORDER — SODIUM CHLORIDE 9 MG/ML
500 INJECTION INTRAMUSCULAR; INTRAVENOUS; SUBCUTANEOUS ONCE
Refills: 0 | Status: DISCONTINUED | OUTPATIENT
Start: 2022-01-28 | End: 2022-01-28

## 2022-01-28 RX ADMIN — MORPHINE SULFATE 4 MILLIGRAM(S): 50 CAPSULE, EXTENDED RELEASE ORAL at 13:36

## 2022-01-28 RX ADMIN — SODIUM CHLORIDE 1000 MILLILITER(S): 9 INJECTION INTRAMUSCULAR; INTRAVENOUS; SUBCUTANEOUS at 13:35

## 2022-01-28 RX ADMIN — ONDANSETRON 8 MILLIGRAM(S): 8 TABLET, FILM COATED ORAL at 13:33

## 2022-01-28 NOTE — ED STATDOCS - PROGRESS NOTE DETAILS
Blair DANIEL for ED attending, Dr. Pedraza. 85 y/o female with PMHx Depression, HLD, and Hypothyroidism on Lyrica presents to ED c/o back pain/injury. Patient reports lower back pain that began when she woke up with associated nausea. Reports she had a UTI 1 month ago. Reports she saw a spine surgeon in [place] for her pain, she was told the surgeon would not operate on her back. Denies vomiting, urinary symptoms, fever. Will send to [facility] for further evaluation. Blair DANIEL for ED attending, Dr. Pedraza. 85 y/o female with  c/o back pain.  Report awakening with pain in buttock thi morning, assoc with nausea.  Denies fever.  Denies urinary symtpoms. Reports h/o back pain 5 years ago successfully treated with lyrica and hasn't had back problem since.  PE: no midline lumbar or acral TTP, pain to right buttock without skin lesion or erythema.  Focused evaluation performed at intake to enter orders appropriate for patient's complaint.  Patient placed in the main ED to complete full evaluation by another provider.

## 2022-01-28 NOTE — ED PROVIDER NOTE - CARE PROVIDER_API CALL
Rodriguez Gann)  [employer] [place] [address] [place] [address]  Phone: [phone]  Fax: [phone]  Follow Up Time: Urgent

## 2022-01-28 NOTE — ED ADULT NURSE NOTE - NSIMPLEMENTINTERV_GEN_ALL_ED
Implemented All Universal Safety Interventions:  [place] to call system. Call bell, personal items and telephone within reach. Instruct patient to call for assistance. Room bathroom lighting operational. Non-slip footwear when patient is off stretcher. Physically safe environment: no spills, clutter or unnecessary equipment. Stretcher in lowest position, wheels locked, appropriate side rails in place.

## 2022-01-28 NOTE — ED PROVIDER NOTE - PATIENT PORTAL LINK FT
You can access the FollowMyHealth Patient Portal offered by [facility] by registering at the following website: [email or web address]. By joining [employer]’s FollowMyHealth portal, you will also be able to view your health information using other applications (apps) compatible with our system.

## 2022-01-28 NOTE — ED PROVIDER NOTE - OBJECTIVE STATEMENT
85 yo female history of Hypothyroidism presents w b/l buttock pain that began suddenly upon waking this am. She states that she has experienced this pain in the past, has not experienced this pain in several years. She states that the pain is constant, pulsating, located in her b/l buttocks. She is still able to ambulate without difficulty. She took Ibuprofen this am for pain relief. Denies fever/chills, cough, sore throat, cp, sob, abd pain, n/v/d, urinary complaints.

## 2022-01-28 NOTE — ED PROVIDER NOTE - ATTENDING CONTRIBUTION TO CARE
Pertinent PMH/PSH/FHx/SHx and Review of Systems contained within:  Patient presents to the ED for buttock pain with anterior thigh paresthesia and lower abd pain that awoke her from sleep.  VSS.  PMH of lower back/sacral pain on lyrica for the past 5 years.  Otherwise baseline.  no trauma.  no other PMH.  Patient has not felt anything like this in the past.  Non toxic.  Well appearing. No aggravating or relieving factors. No other pertinent PMH.  No other pertinent PSH.  No other pertinent FHx.  Patient denies EtOH/tobacco/illicit substance use. No fever/chills, No photophobia/eye pain/changes in vision, No ear pain/sore throat/dysphagia, No chest pain/palpitations, no SOB/cough/wheeze/stridor, No abdominal pain, No N/V/D, no dysuria/frequency/discharge, No neck pain, no rash, no changes in neurological status/function.     Gen: Alert, NAD  Head: NC, AT, PERRL, EOMI, normal lids/conjunctiva  ENT: normal hearing, patent oropharynx without erythema/exudate, uvula midline  Neck: +supple, no tenderness/meningismus/JVD, +Trachea midline  Pulm: Bilateral BS, normal resp effort, no wheeze/stridor/retractions  CV: RRR, no R/G, +dist pulses  Abd: soft, NT/ND, +BS, no hepatosplenomegaly  Mskel: no edema/erythema/cyanosis  Skin: no rash, no sacral ecchymosis  Neuro: AAOx3, subjective paresthesia/numbness on B ant/lateral thighs, otherwise no gross sensory/motor deficits,    Patient with atraumatic buttock pain.  VSS.  CTA demonstrates no acute pathology. Lab values do not require emergent intervention. Non toxic.  Well appearing. Pertinent PMH/PSH/FHx/SHx and Review of Systems contained within:  Patient presents to the ED for buttock pain with anterior thigh paresthesia and lower abd pain that awoke her from sleep.  VSS.  PMH of lower back/sacral pain on lyrica for the past 5 years.  Otherwise baseline.  no trauma.  no other PMH.  Patient has not felt anything like this in the past.  Non toxic.  Well appearing. No aggravating or relieving factors. No other pertinent PMH.  No other pertinent PSH.  No other pertinent FHx.  Patient denies EtOH/tobacco/illicit substance use. No fever/chills, No photophobia/eye pain/changes in vision, No ear pain/sore throat/dysphagia, No chest pain/palpitations, no SOB/cough/wheeze/stridor, No abdominal pain, No N/V/D, no dysuria/frequency/discharge, No neck pain, no rash, no changes in neurological status/function.     Gen: Alert, NAD  Head: NC, AT, PERRL, EOMI, normal lids/conjunctiva  ENT: normal hearing, patent oropharynx without erythema/exudate, uvula midline  Neck: +supple, no tenderness/meningismus/JVD, +Trachea midline  Pulm: Bilateral BS, normal resp effort, no wheeze/stridor/retractions  CV: RRR, no R/G, +dist pulses  Abd: soft, NT/ND, +BS, no hepatosplenomegaly  Mskel: no edema/erythema/cyanosis  Skin: no rash, no sacral ecchymosis  Neuro: AAOx3, subjective paresthesia/numbness on B ant/lateral thighs, otherwise no gross sensory/motor deficits,    Patient with atraumatic buttock pain.  VSS.  CTA demonstrates no acute pathology. Lab values do not require emergent intervention. Non toxic.  Well appearing.  Sx now resolved on reassessment.  d/w patient and wants to d/c.  will f/u.  no saddle anesthesia, no paresthesia, no buttock pain on re-examination.  Discussed signs and symptoms and reasons for return with good teachback. Discussed results and outcome of testing with the patient.  Patient advised to please follow up with their primary care doctor within the next 24 hours and return to the Emergency Department for worsening symptoms or any other concerns.  Patient advised that their doctor may call  to follow up on the specific results of the tests performed today in the emergency department.

## 2022-01-28 NOTE — ED PROVIDER NOTE - PROGRESS NOTE DETAILS
Bonita WONG: Patient needs CT angio with IV contrast to r/o dissection. The benefits of the patient receiving the contrast for the study outweigh the risks of the patient receiving the IV contrast. Bonita: Patient feels her pain is improved at this time. Denies abd pain at this time. Denies numbness anywhere at this time. Normal neurologic exam at this time. Plan to dc the patient.
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
## 2022-01-28 NOTE — ED ADULT TRIAGE NOTE - CHIEF COMPLAINT QUOTE
pt states she has back pain & nausea onset in the am, last time it was a UTI   A&ox3, resp wnl, denies CP

## 2022-01-28 NOTE — ED STATDOCS - OBJECTIVE STATEMENT
85 y/o female with PMHx Depression, HLD, and Hypothyroidism on Lyrica presents to ED c/o back pain/injury. Patient reports lower back pain that began when she woke up with associated nausea. Reports she had a UTI 1 month ago. Reports she saw a spine surgeon in [place] for her pain, she was told the surgeon would not operate on her back.     Denies vomiting, urinary symptoms, fever

## 2022-01-28 NOTE — ED PROVIDER NOTE - PHYSICAL EXAMINATION
Gen: Well appearing in NAD  Head: NC/AT  Neck: trachea midline  Resp:  No distress, lungs cta  Cardiac: Heart RRR, no murmurs  Ext: no deformities  Musculoskeletal: B /L buttocks tenderness to palpation. No back tenderness to palpation. No difficulty with ambulation.   Neuro:  A&O appears non focal  Skin:  Warm and dry as visualized  Psych:  Normal affect and mood

## 2022-01-29 LAB
CULTURE RESULTS: SIGNIFICANT CHANGE UP
SPECIMEN SOURCE: SIGNIFICANT CHANGE UP

## 2022-02-01 ENCOUNTER — EMERGENCY (EMERGENCY)
Facility: HOSPITAL | Age: 87
LOS: 1 days | Discharge: HOME | End: 2022-02-01
Attending: STUDENT IN AN ORGANIZED HEALTH CARE EDUCATION/TRAINING PROGRAM
Payer: MEDICARE

## 2022-02-01 VITALS
HEART RATE: 92 BPM | RESPIRATION RATE: 18 BRPM | DIASTOLIC BLOOD PRESSURE: 90 MMHG | OXYGEN SATURATION: 96 % | SYSTOLIC BLOOD PRESSURE: 152 MMHG | HEIGHT: 65 IN | WEIGHT: 149.91 LBS | TEMPERATURE: 98 F

## 2022-02-01 PROCEDURE — 99283 EMERGENCY DEPT VISIT LOW MDM: CPT

## 2022-02-01 RX ORDER — ONDANSETRON 8 MG/1
4 TABLET, FILM COATED ORAL ONCE
Refills: 0 | Status: COMPLETED | OUTPATIENT
Start: 2022-02-01 | End: 2022-02-01

## 2022-02-01 RX ORDER — MORPHINE SULFATE 50 MG/1
1 CAPSULE, EXTENDED RELEASE ORAL
Qty: 28 | Refills: 0
Start: 2022-02-01 | End: 2022-02-14

## 2022-02-01 RX ORDER — MORPHINE SULFATE 50 MG/1
15 CAPSULE, EXTENDED RELEASE ORAL ONCE
Refills: 0 | Status: DISCONTINUED | OUTPATIENT
Start: 2022-02-01 | End: 2022-02-01

## 2022-02-01 RX ORDER — ONDANSETRON 8 MG/1
1 TABLET, FILM COATED ORAL
Qty: 7 | Refills: 0
Start: 2022-02-01 | End: 2022-02-07

## 2022-02-01 RX ADMIN — MORPHINE SULFATE 15 MILLIGRAM(S): 50 CAPSULE, EXTENDED RELEASE ORAL at 16:42

## 2022-02-01 RX ADMIN — ONDANSETRON 4 MILLIGRAM(S): 8 TABLET, FILM COATED ORAL at 16:42

## 2022-02-01 NOTE — ED PROVIDER NOTE - PROGRESS NOTE DETAILS
Bonita: Patient feels improved after pain medication. She is ambulatory in the ED. Will prescribe short course of pain medication, plan to dc w pain management. Patient understood and is comfortable with the plan.

## 2022-02-01 NOTE — ED PROVIDER NOTE - OBJECTIVE STATEMENT
87 yo female history of Hypothyroidism presents w back pain that has persisted over the past several days. She states that her pain was initially relieved by the morphine she received in the ED last week when she was evaluated for a similar complaint. She denies any recent trauma to her back, no saddle anesthesia, no bowel/bladder incontinence. She states that she went to a pain management specialist yesterday who prescribed her Tramadol. She states that the Tramadol has been minimally helping her symptoms. She is scheduled for epidural in three weeks. Denies fever/chills, cough, sore throat, sob, abd pain, n/v/d, urinary complaints, focal weakness/numbness/tingling in extremities.

## 2022-02-01 NOTE — ED PROVIDER NOTE - PHYSICAL EXAMINATION
Gen: Well appearing in NAD  Head: NC/AT  Neck: trachea midline  Resp:  No distress, lungs cta  Cardiac: Heart RRR. No murmurs, rubs, gallops on exam.   Ext: no deformities,  Musculoskeletal: Lumbar spine tenderness to palpation.   Neuro:  A&O appears non focal  Skin:  Warm and dry as visualized  Psych:  Normal affect and mood

## 2022-02-01 NOTE — ED PROVIDER NOTE - ATTENDING CONTRIBUTION TO CARE
85 yo female history of Hypothyroidism, chronic back pain maintained on lyrica here with persistent back pain. Seen here 4 days ago with negative CT imaging, was recommended to see pain management which she tried to see but unable to see doctor but did get appt for epidural on 2/23. Reports morphine that was given here helped but tramadol that shes on now is not helpful. Denies any new falls. Denies new numbness, tingling, weakness. 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 Is able to walk w/o assistance   AP - discussion with patient and son, likely worsening of disease. non focal neuro exam. offered CT or MR/obs but family comfortable with pain meds and reassess for outpt f/u. return precautions discussed

## 2022-02-01 NOTE — ED PROVIDER NOTE - PATIENT PORTAL LINK FT
You can access the FollowMyHealth Patient Portal offered by [facility] by registering at the following website: [email or web address]. By joining [employer]’s FollowMyHealth portal, you will also be able to view your health information using other applications (apps) compatible with our system.

## 2022-02-01 NOTE — ED ADULT TRIAGE NOTE - CHIEF COMPLAINT QUOTE
pt c/o low back pain, was here Friday for the same, supposed to have an epidural but not until 3 weeks  A&Ox3, resp wnl, Tramadol 50 mg

## 2022-02-01 NOTE — ED PROVIDER NOTE - CLINICAL SUMMARY MEDICAL DECISION MAKING FREE TEXT BOX
87 yo female hx of chronic back pain presents with lumbar spine tenderness to palpation. Will obtain CT Lumbar spine, pain control w PO morphine. Monitor and reassess.

## 2022-02-01 NOTE — ED ADULT NURSE NOTE - OBJECTIVE STATEMENT
86F, nad, breathing even and unlabored, alert and oriented x 3, ambulates with steady gait off unit accompanied by son after being seen and discharged by Dr Mesa prior to full RN assessment.

## 2022-02-01 NOTE — ED PROVIDER NOTE - CARE PROVIDER_API CALL
Bekah Downing)  Anesthesiology; Pain Medicine  [address] [place] [address], [place] [address]  Phone: [phone]  Fax: [phone]  Follow Up Time: Urgent

## 2022-02-02 ENCOUNTER — EMERGENCY (EMERGENCY)
Facility: HOSPITAL | Age: 87
LOS: 1 days | Discharge: HOME | End: 2022-02-02
Attending: EMERGENCY MEDICINE
Payer: MEDICARE

## 2022-02-02 VITALS
RESPIRATION RATE: 20 BRPM | DIASTOLIC BLOOD PRESSURE: 94 MMHG | HEIGHT: 65 IN | OXYGEN SATURATION: 94 % | WEIGHT: 149.91 LBS | HEART RATE: 92 BPM | TEMPERATURE: 98 F | SYSTOLIC BLOOD PRESSURE: 163 MMHG

## 2022-02-02 VITALS
RESPIRATION RATE: 16 BRPM | SYSTOLIC BLOOD PRESSURE: 143 MMHG | TEMPERATURE: 98 F | OXYGEN SATURATION: 96 % | DIASTOLIC BLOOD PRESSURE: 82 MMHG | HEART RATE: 77 BPM

## 2022-02-02 LAB
ALBUMIN SERPL ELPH-MCNC: 4.2 G/DL — SIGNIFICANT CHANGE UP (ref 3.3–5.2)
ALP SERPL-CCNC: 82 U/L — SIGNIFICANT CHANGE UP (ref 40–120)
ALT FLD-CCNC: 16 U/L — SIGNIFICANT CHANGE UP
ANION GAP SERPL CALC-SCNC: 14 MMOL/L — SIGNIFICANT CHANGE UP (ref 5–17)
APPEARANCE UR: CLEAR — SIGNIFICANT CHANGE UP
AST SERPL-CCNC: 24 U/L — SIGNIFICANT CHANGE UP
BASOPHILS # BLD AUTO: 0.07 K/UL — SIGNIFICANT CHANGE UP (ref 0–0.2)
BASOPHILS NFR BLD AUTO: 0.8 % — SIGNIFICANT CHANGE UP (ref 0–2)
BILIRUB SERPL-MCNC: 1 MG/DL — SIGNIFICANT CHANGE UP (ref 0.4–2)
BILIRUB UR-MCNC: NEGATIVE — SIGNIFICANT CHANGE UP
BUN SERPL-MCNC: 15.9 MG/DL — SIGNIFICANT CHANGE UP (ref 8–20)
CALCIUM SERPL-MCNC: 9.1 MG/DL — SIGNIFICANT CHANGE UP (ref 8.6–10.2)
CHLORIDE SERPL-SCNC: 100 MMOL/L — SIGNIFICANT CHANGE UP (ref 98–107)
CO2 SERPL-SCNC: 27 MMOL/L — SIGNIFICANT CHANGE UP (ref 22–29)
COLOR SPEC: YELLOW — SIGNIFICANT CHANGE UP
CREAT SERPL-MCNC: 0.95 MG/DL — SIGNIFICANT CHANGE UP (ref 0.5–1.3)
DIFF PNL FLD: NEGATIVE — SIGNIFICANT CHANGE UP
EOSINOPHIL # BLD AUTO: 0.19 K/UL — SIGNIFICANT CHANGE UP (ref 0–0.5)
EOSINOPHIL NFR BLD AUTO: 2.3 % — SIGNIFICANT CHANGE UP (ref 0–6)
GLUCOSE SERPL-MCNC: 120 MG/DL — HIGH (ref 70–99)
GLUCOSE UR QL: NEGATIVE MG/DL — SIGNIFICANT CHANGE UP
HCT VFR BLD CALC: 42.5 % — SIGNIFICANT CHANGE UP (ref 34.5–45)
HGB BLD-MCNC: 14 G/DL — SIGNIFICANT CHANGE UP (ref 11.5–15.5)
IMM GRANULOCYTES NFR BLD AUTO: 0.4 % — SIGNIFICANT CHANGE UP (ref 0–1.5)
KETONES UR-MCNC: ABNORMAL
LACTATE BLDV-MCNC: 2 MMOL/L — SIGNIFICANT CHANGE UP (ref 0.5–2)
LEUKOCYTE ESTERASE UR-ACNC: NEGATIVE — SIGNIFICANT CHANGE UP
LYMPHOCYTES # BLD AUTO: 1.76 K/UL — SIGNIFICANT CHANGE UP (ref 1–3.3)
LYMPHOCYTES # BLD AUTO: 21 % — SIGNIFICANT CHANGE UP (ref 13–44)
MAGNESIUM SERPL-MCNC: 2.3 MG/DL — SIGNIFICANT CHANGE UP (ref 1.8–2.6)
MCHC RBC-ENTMCNC: 30.6 PG — SIGNIFICANT CHANGE UP (ref 27–34)
MCHC RBC-ENTMCNC: 32.9 GM/DL — SIGNIFICANT CHANGE UP (ref 32–36)
MCV RBC AUTO: 92.8 FL — SIGNIFICANT CHANGE UP (ref 80–100)
MONOCYTES # BLD AUTO: 0.47 K/UL — SIGNIFICANT CHANGE UP (ref 0–0.9)
MONOCYTES NFR BLD AUTO: 5.6 % — SIGNIFICANT CHANGE UP (ref 2–14)
NEUTROPHILS # BLD AUTO: 5.86 K/UL — SIGNIFICANT CHANGE UP (ref 1.8–7.4)
NEUTROPHILS NFR BLD AUTO: 69.9 % — SIGNIFICANT CHANGE UP (ref 43–77)
NITRITE UR-MCNC: NEGATIVE — SIGNIFICANT CHANGE UP
PH UR: 7 — SIGNIFICANT CHANGE UP (ref 5–8)
PLATELET # BLD AUTO: 274 K/UL — SIGNIFICANT CHANGE UP (ref 150–400)
POTASSIUM SERPL-MCNC: 4.2 MMOL/L — SIGNIFICANT CHANGE UP (ref 3.5–5.3)
POTASSIUM SERPL-SCNC: 4.2 MMOL/L — SIGNIFICANT CHANGE UP (ref 3.5–5.3)
PROT SERPL-MCNC: 7.4 G/DL — SIGNIFICANT CHANGE UP (ref 6.6–8.7)
PROT UR-MCNC: NEGATIVE — SIGNIFICANT CHANGE UP
RBC # BLD: 4.58 M/UL — SIGNIFICANT CHANGE UP (ref 3.8–5.2)
RBC # FLD: 12.5 % — SIGNIFICANT CHANGE UP (ref 10.3–14.5)
SODIUM SERPL-SCNC: 141 MMOL/L — SIGNIFICANT CHANGE UP (ref 135–145)
SP GR SPEC: 1 — LOW (ref 1.01–1.02)
TROPONIN T SERPL-MCNC: <0.01 NG/ML — SIGNIFICANT CHANGE UP (ref 0–0.06)
UROBILINOGEN FLD QL: NEGATIVE MG/DL — SIGNIFICANT CHANGE UP
WBC # BLD: 8.38 K/UL — SIGNIFICANT CHANGE UP (ref 3.8–10.5)
WBC # FLD AUTO: 8.38 K/UL — SIGNIFICANT CHANGE UP (ref 3.8–10.5)

## 2022-02-02 PROCEDURE — 83735 ASSAY OF MAGNESIUM: CPT

## 2022-02-02 PROCEDURE — 70450 CT HEAD/BRAIN W/O DYE: CPT | Mod: 26,MB

## 2022-02-02 PROCEDURE — 99284 EMERGENCY DEPT VISIT MOD MDM: CPT | Mod: 25

## 2022-02-02 PROCEDURE — 81003 URINALYSIS AUTO W/O SCOPE: CPT

## 2022-02-02 PROCEDURE — 74177 CT ABD & PELVIS W/CONTRAST: CPT | Mod: MA

## 2022-02-02 PROCEDURE — 96375 TX/PRO/DX INJ NEW DRUG ADDON: CPT

## 2022-02-02 PROCEDURE — 80053 COMPREHEN METABOLIC PANEL: CPT

## 2022-02-02 PROCEDURE — 85025 COMPLETE CBC W/AUTO DIFF WBC: CPT

## 2022-02-02 PROCEDURE — 36415 COLL VENOUS BLD VENIPUNCTURE: CPT

## 2022-02-02 PROCEDURE — 72131 CT LUMBAR SPINE W/O DYE: CPT | Mod: 26,MA

## 2022-02-02 PROCEDURE — 83605 ASSAY OF LACTIC ACID: CPT

## 2022-02-02 PROCEDURE — 96374 THER/PROPH/DIAG INJ IV PUSH: CPT | Mod: XU

## 2022-02-02 PROCEDURE — 99284 EMERGENCY DEPT VISIT MOD MDM: CPT

## 2022-02-02 PROCEDURE — 84484 ASSAY OF TROPONIN QUANT: CPT

## 2022-02-02 PROCEDURE — 87086 URINE CULTURE/COLONY COUNT: CPT

## 2022-02-02 PROCEDURE — 74177 CT ABD & PELVIS W/CONTRAST: CPT | Mod: 26,MA

## 2022-02-02 PROCEDURE — 70450 CT HEAD/BRAIN W/O DYE: CPT | Mod: MB

## 2022-02-02 RX ORDER — METOCLOPRAMIDE HCL 10 MG
10 TABLET ORAL ONCE
Refills: 0 | Status: COMPLETED | OUTPATIENT
Start: 2022-02-02 | End: 2022-02-02

## 2022-02-02 RX ORDER — KETOROLAC TROMETHAMINE 30 MG/ML
15 SYRINGE (ML) INJECTION ONCE
Refills: 0 | Status: DISCONTINUED | OUTPATIENT
Start: 2022-02-02 | End: 2022-02-02

## 2022-02-02 RX ORDER — ACETAMINOPHEN 500 MG
1000 TABLET ORAL ONCE
Refills: 0 | Status: COMPLETED | OUTPATIENT
Start: 2022-02-02 | End: 2022-02-02

## 2022-02-02 RX ORDER — LIDOCAINE 4 G/100G
1 CREAM TOPICAL
Qty: 7 | Refills: 0
Start: 2022-02-02 | End: 2022-02-08

## 2022-02-02 RX ORDER — ONDANSETRON 8 MG/1
4 TABLET, FILM COATED ORAL ONCE
Refills: 0 | Status: COMPLETED | OUTPATIENT
Start: 2022-02-02 | End: 2022-02-02

## 2022-02-02 RX ORDER — LIDOCAINE 4 G/100G
1 CREAM TOPICAL ONCE
Refills: 0 | Status: COMPLETED | OUTPATIENT
Start: 2022-02-02 | End: 2022-02-02

## 2022-02-02 RX ORDER — METHOCARBAMOL 500 MG/1
750 TABLET, FILM COATED ORAL ONCE
Refills: 0 | Status: COMPLETED | OUTPATIENT
Start: 2022-02-02 | End: 2022-02-02

## 2022-02-02 RX ORDER — SODIUM CHLORIDE 9 MG/ML
1000 INJECTION, SOLUTION INTRAVENOUS ONCE
Refills: 0 | Status: COMPLETED | OUTPATIENT
Start: 2022-02-02 | End: 2022-02-02

## 2022-02-02 RX ADMIN — Medication 15 MILLIGRAM(S): at 14:00

## 2022-02-02 RX ADMIN — Medication 400 MILLIGRAM(S): at 14:00

## 2022-02-02 RX ADMIN — LIDOCAINE 1 PATCH: 4 CREAM TOPICAL at 14:18

## 2022-02-02 RX ADMIN — ONDANSETRON 4 MILLIGRAM(S): 8 TABLET, FILM COATED ORAL at 14:00

## 2022-02-02 RX ADMIN — SODIUM CHLORIDE 500 MILLILITER(S): 9 INJECTION, SOLUTION INTRAVENOUS at 14:00

## 2022-02-02 RX ADMIN — Medication 10 MILLIGRAM(S): at 14:00

## 2022-02-02 NOTE — ED ADULT TRIAGE NOTE - CHIEF COMPLAINT QUOTE
Pt c/o nausea, vomiting.  Pt was treated at [facility] yesterday for back pain and is having adverse reaction to pain meds.

## 2022-02-02 NOTE — ED STATDOCS - CLINICAL SUMMARY MEDICAL DECISION MAKING FREE TEXT BOX
Suspect medication intolerance but given severity of nausea and vomiting will rule out bowel pathology with CT, medicate for back pain and nausea, and reevaluate Suspect medication intolerance but given severity of nausea and vomiting, CT obtained to r/o bowel pathology. pt improved and tolerating PO. Pt with chronic back pain, has pain mgt f/u, no neuro deficits, pain controlled and stable for f/u

## 2022-02-02 NOTE — ED STATDOCS - ATTENDING CONTRIBUTION TO CARE
I, Jurgen Reddy, performed the initial face to face bedside interview with this patient regarding history of present illness, review of symptoms and relevant past medical, social and family history.  I completed an independent physical examination.  I was the initial provider who evaluated this patient. I have signed out the follow up of any pending tests (i.e. labs, radiological studies) to the ACP.  I have communicated the patient’s plan of care and disposition with the ACP.

## 2022-02-02 NOTE — ED STATDOCS - PROGRESS NOTE DETAILS
PT evaluated by intake physician. HPI/PE/ROS as noted above. Will follow up plan per intake physician. pt feeling better. tolerating PO. Advised of results. CTA abd shows diminished enhancement in kidney ? pyelonephritis vs infarct. Discussed results with attending and recommends outpt nephrology f/u as well as checking UA. UA negative. Will dc with f/u and return precautions.

## 2022-02-02 NOTE — ED STATDOCS - PATIENT PORTAL LINK FT
You can access the FollowMyHealth Patient Portal offered by [facility] by registering at the following website: [email or web address]. By joining [employer]’s FollowMyHealth portal, you will also be able to view your health information using other applications (apps) compatible with our system.

## 2022-02-02 NOTE — ED ADULT NURSE NOTE - OBJECTIVE STATEMENT
86 F, nad, alert and oriented x 3 c/o lower back pain onset Friday, nontraumatic and history of same with pain management appt set for Feb 23 c/o vomiting when taking pain meds as prescribed for her back pain. Fall precautions in place, son at bedside.

## 2022-02-02 NOTE — ED ADULT NURSE NOTE - NSIMPLEMENTINTERV_GEN_ALL_ED
Implemented All Universal Safety Interventions:  [place] to call system. Call bell, personal items and telephone within reach. Instruct patient to call for assistance. Room bathroom lighting operational. Non-slip footwear when patient is off stretcher. Physically safe environment: no spills, clutter or unnecessary equipment. Stretcher in lowest position, wheels locked, appropriate side rails in place.
Principal Discharge DX:	Altered mental status  Secondary Diagnosis:	Drug abuse

## 2022-02-02 NOTE — ED STATDOCS - NSFOLLOWUPINSTRUCTIONS_ED_ALL_ED_FT
Follow up with nephrology.  Follow up with PMD.  Follow up with pain management.  Use lidoderm patches as prescribed.  Come back with new or worsening symptoms.   Nausea / Vomiting    Nausea is the feeling that you have to vomit. As nausea gets worse, it can lead to vomiting. Vomiting puts you at an increased risk for dehydration. Older adults and people with other diseases or a weak immune system are at higher risk for dehydration. Drink clear fluids in small but frequent amounts as tolerated. Eat bland, easy-to-digest foods in small amounts as tolerated.    SEEK IMMEDIATE MEDICAL CARE IF YOU HAVE ANY OF THE FOLLOWING SYMPTOMS: fever, inability to keep sufficient fluids down, black or bloody vomitus, black or bloody stools, lightheadedness/dizziness, chest pain, severe headache, rash, shortness of breath, cold or clammy skin, confusion, pain with urination, or any signs of dehydration. no

## 2022-02-02 NOTE — ED STATDOCS - MUSCULOSKELETAL, MLM
range of motion is not limited and there is no muscle tenderness. Muscle strength 4/5 left lower extremity.

## 2022-02-02 NOTE — ED ADULT NURSE NOTE - CHIEF COMPLAINT QUOTE
Pt c/o nausea, vomiting.  Pt was treated at [facility] yesterday for back pain and is having adverse reaction to pain meds.

## 2022-02-02 NOTE — ED STATDOCS - OBJECTIVE STATEMENT
87 Y/O female w/ PMHx. of hypothyroid, high cholesterol, and depression presents to ED c/o persistent vomiting s/p taking second morphine pill s/p ED visit yesterday for severe back pain. PT is scheduled for epidural on 02/23/22. PT was given tramadol on previous ED visits which was not tolerated and states at prior visits Zofran has not helped either. PT endorses bilateral buttock pain but denies any decreased sensation, new onset decreased ROM, numbness or tingling in the extremities.

## 2022-02-02 NOTE — ED STATDOCS - CARE PROVIDER_API CALL
Calixto Pedersen)  Internal Medicine; Nephrology  [address] [place] [address]  Phone: [phone]  Fax: [phone]  Follow Up Time:     Denys Snyder)  Anesthesiology; Pain Medicine  [address] [place] [address]  Phone: [phone]  Fax: [phone]  Follow Up Time:

## 2022-02-03 LAB
CULTURE RESULTS: SIGNIFICANT CHANGE UP
SPECIMEN SOURCE: SIGNIFICANT CHANGE UP

## 2022-02-21 ENCOUNTER — TRANSCRIPTION ENCOUNTER (OUTPATIENT)
Age: 87
End: 2022-02-21

## 2022-02-22 ENCOUNTER — OUTPATIENT (OUTPATIENT)
Dept: OUTPATIENT SERVICES | Facility: HOSPITAL | Age: 87
LOS: 1 days | End: 2022-02-22
Payer: MEDICARE

## 2022-02-22 DIAGNOSIS — M53.3 SACROCOCCYGEAL DISORDERS, NOT ELSEWHERE CLASSIFIED: ICD-10-CM

## 2022-02-22 PROCEDURE — 62323 NJX INTERLAMINAR LMBR/SAC: CPT

## 2022-02-22 PROCEDURE — 77003 FLUOROGUIDE FOR SPINE INJECT: CPT

## 2022-04-23 ENCOUNTER — EMERGENCY (EMERGENCY)
Facility: HOSPITAL | Age: 87
LOS: 1 days | Discharge: HOME | End: 2022-04-23
Attending: EMERGENCY MEDICINE
Payer: MEDICARE

## 2022-04-23 VITALS
HEART RATE: 78 BPM | RESPIRATION RATE: 15 BRPM | DIASTOLIC BLOOD PRESSURE: 78 MMHG | SYSTOLIC BLOOD PRESSURE: 164 MMHG | OXYGEN SATURATION: 96 %

## 2022-04-23 VITALS
DIASTOLIC BLOOD PRESSURE: 84 MMHG | HEIGHT: 63 IN | SYSTOLIC BLOOD PRESSURE: 190 MMHG | RESPIRATION RATE: 18 BRPM | OXYGEN SATURATION: 96 % | HEART RATE: 88 BPM | TEMPERATURE: 98 F | WEIGHT: 149.91 LBS

## 2022-04-23 LAB
ALBUMIN SERPL ELPH-MCNC: 3.7 G/DL — SIGNIFICANT CHANGE UP (ref 3.3–5.2)
ALP SERPL-CCNC: 75 U/L — SIGNIFICANT CHANGE UP (ref 40–120)
ALT FLD-CCNC: 18 U/L — SIGNIFICANT CHANGE UP
ANION GAP SERPL CALC-SCNC: 13 MMOL/L — SIGNIFICANT CHANGE UP (ref 5–17)
APTT BLD: 28.5 SEC — SIGNIFICANT CHANGE UP (ref 27.5–35.5)
AST SERPL-CCNC: 21 U/L — SIGNIFICANT CHANGE UP
BASOPHILS # BLD AUTO: 0.07 K/UL — SIGNIFICANT CHANGE UP (ref 0–0.2)
BASOPHILS NFR BLD AUTO: 0.9 % — SIGNIFICANT CHANGE UP (ref 0–2)
BILIRUB SERPL-MCNC: 0.5 MG/DL — SIGNIFICANT CHANGE UP (ref 0.4–2)
BUN SERPL-MCNC: 15.7 MG/DL — SIGNIFICANT CHANGE UP (ref 8–20)
CALCIUM SERPL-MCNC: 8.9 MG/DL — SIGNIFICANT CHANGE UP (ref 8.6–10.2)
CHLORIDE SERPL-SCNC: 104 MMOL/L — SIGNIFICANT CHANGE UP (ref 98–107)
CO2 SERPL-SCNC: 24 MMOL/L — SIGNIFICANT CHANGE UP (ref 22–29)
CREAT SERPL-MCNC: 0.8 MG/DL — SIGNIFICANT CHANGE UP (ref 0.5–1.3)
EGFR: 73 ML/MIN/1.73M2 — SIGNIFICANT CHANGE UP
EOSINOPHIL # BLD AUTO: 0.47 K/UL — SIGNIFICANT CHANGE UP (ref 0–0.5)
EOSINOPHIL NFR BLD AUTO: 6.3 % — HIGH (ref 0–6)
GLUCOSE SERPL-MCNC: 134 MG/DL — HIGH (ref 70–99)
HCT VFR BLD CALC: 40.8 % — SIGNIFICANT CHANGE UP (ref 34.5–45)
HGB BLD-MCNC: 13.1 G/DL — SIGNIFICANT CHANGE UP (ref 11.5–15.5)
IMM GRANULOCYTES NFR BLD AUTO: 0.5 % — SIGNIFICANT CHANGE UP (ref 0–1.5)
INR BLD: 1.01 RATIO — SIGNIFICANT CHANGE UP (ref 0.88–1.16)
LYMPHOCYTES # BLD AUTO: 2.13 K/UL — SIGNIFICANT CHANGE UP (ref 1–3.3)
LYMPHOCYTES # BLD AUTO: 28.6 % — SIGNIFICANT CHANGE UP (ref 13–44)
MCHC RBC-ENTMCNC: 29.8 PG — SIGNIFICANT CHANGE UP (ref 27–34)
MCHC RBC-ENTMCNC: 32.1 GM/DL — SIGNIFICANT CHANGE UP (ref 32–36)
MCV RBC AUTO: 92.9 FL — SIGNIFICANT CHANGE UP (ref 80–100)
MONOCYTES # BLD AUTO: 0.54 K/UL — SIGNIFICANT CHANGE UP (ref 0–0.9)
MONOCYTES NFR BLD AUTO: 7.2 % — SIGNIFICANT CHANGE UP (ref 2–14)
NEUTROPHILS # BLD AUTO: 4.2 K/UL — SIGNIFICANT CHANGE UP (ref 1.8–7.4)
NEUTROPHILS NFR BLD AUTO: 56.5 % — SIGNIFICANT CHANGE UP (ref 43–77)
PLATELET # BLD AUTO: 282 K/UL — SIGNIFICANT CHANGE UP (ref 150–400)
POTASSIUM SERPL-MCNC: 4 MMOL/L — SIGNIFICANT CHANGE UP (ref 3.5–5.3)
POTASSIUM SERPL-SCNC: 4 MMOL/L — SIGNIFICANT CHANGE UP (ref 3.5–5.3)
PROT SERPL-MCNC: 7 G/DL — SIGNIFICANT CHANGE UP (ref 6.6–8.7)
PROTHROM AB SERPL-ACNC: 11.7 SEC — SIGNIFICANT CHANGE UP (ref 10.5–13.4)
RBC # BLD: 4.39 M/UL — SIGNIFICANT CHANGE UP (ref 3.8–5.2)
RBC # FLD: 12.6 % — SIGNIFICANT CHANGE UP (ref 10.3–14.5)
SODIUM SERPL-SCNC: 141 MMOL/L — SIGNIFICANT CHANGE UP (ref 135–145)
TROPONIN T SERPL-MCNC: <0.01 NG/ML — SIGNIFICANT CHANGE UP (ref 0–0.06)
WBC # BLD: 7.45 K/UL — SIGNIFICANT CHANGE UP (ref 3.8–10.5)
WBC # FLD AUTO: 7.45 K/UL — SIGNIFICANT CHANGE UP (ref 3.8–10.5)

## 2022-04-23 PROCEDURE — 99285 EMERGENCY DEPT VISIT HI MDM: CPT | Mod: 25

## 2022-04-23 PROCEDURE — 70450 CT HEAD/BRAIN W/O DYE: CPT | Mod: 26,MA

## 2022-04-23 PROCEDURE — 36415 COLL VENOUS BLD VENIPUNCTURE: CPT

## 2022-04-23 PROCEDURE — 93010 ELECTROCARDIOGRAM REPORT: CPT

## 2022-04-23 PROCEDURE — 99284 EMERGENCY DEPT VISIT MOD MDM: CPT

## 2022-04-23 PROCEDURE — 96374 THER/PROPH/DIAG INJ IV PUSH: CPT

## 2022-04-23 PROCEDURE — 80053 COMPREHEN METABOLIC PANEL: CPT

## 2022-04-23 PROCEDURE — 70450 CT HEAD/BRAIN W/O DYE: CPT | Mod: MA

## 2022-04-23 PROCEDURE — 84484 ASSAY OF TROPONIN QUANT: CPT

## 2022-04-23 PROCEDURE — 85610 PROTHROMBIN TIME: CPT

## 2022-04-23 PROCEDURE — 93005 ELECTROCARDIOGRAM TRACING: CPT

## 2022-04-23 PROCEDURE — 85025 COMPLETE CBC W/AUTO DIFF WBC: CPT

## 2022-04-23 PROCEDURE — 85730 THROMBOPLASTIN TIME PARTIAL: CPT

## 2022-04-23 RX ORDER — LOSARTAN POTASSIUM 100 MG/1
1 TABLET, FILM COATED ORAL
Qty: 30 | Refills: 0
Start: 2022-04-23 | End: 2022-05-22

## 2022-04-23 RX ORDER — LOSARTAN POTASSIUM 100 MG/1
25 TABLET, FILM COATED ORAL ONCE
Refills: 0 | Status: COMPLETED | OUTPATIENT
Start: 2022-04-23 | End: 2022-04-23

## 2022-04-23 RX ORDER — ACETAMINOPHEN 500 MG
650 TABLET ORAL ONCE
Refills: 0 | Status: COMPLETED | OUTPATIENT
Start: 2022-04-23 | End: 2022-04-23

## 2022-04-23 RX ORDER — ONDANSETRON 8 MG/1
4 TABLET, FILM COATED ORAL ONCE
Refills: 0 | Status: COMPLETED | OUTPATIENT
Start: 2022-04-23 | End: 2022-04-23

## 2022-04-23 RX ADMIN — LOSARTAN POTASSIUM 25 MILLIGRAM(S): 100 TABLET, FILM COATED ORAL at 12:09

## 2022-04-23 RX ADMIN — ONDANSETRON 4 MILLIGRAM(S): 8 TABLET, FILM COATED ORAL at 12:12

## 2022-04-23 RX ADMIN — Medication 650 MILLIGRAM(S): at 12:12

## 2022-04-23 NOTE — ED PROVIDER NOTE - PATIENT PORTAL LINK FT
You can access the FollowMyHealth Patient Portal offered by [facility] by registering at the following website: [email or web address]. By joining [employer]’s FollowMyHealth portal, you will also be able to view your health information using other applications (apps) compatible with our system.

## 2022-04-23 NOTE — ED ADULT NURSE NOTE - CHIEF COMPLAINT QUOTE
patient c/o hypertension with dizzy spells and headache, was seen by PMD and was monitoring her BP at home and was found to be high. today BP was too high.

## 2022-04-23 NOTE — ED PROVIDER NOTE - OBJECTIVE STATEMENT
86 year old female with PMH spinal stenosis for which she sees pain mgt, anxiety with recent initiation of klonopin 2 weeks ago, hypothyroid, resting tremor, HLD presents with htn. Pt reports that beginning 9 days ago she began to have episodes of a fullness and dizziness in her head. She states that it is intermittent with no alleviating or exacerbating factors. She states that she went to her pmd and was told that her bp was high, for which she hasno Hx. She was not started on medication and was instead told to log her bp and follow up. She denies feeling off balance, room spinning, facial droop, slurred speech, chets pain, SOB.

## 2022-04-23 NOTE — ED ADULT NURSE NOTE - NSIMPLEMENTINTERV_GEN_ALL_ED
Implemented All Fall with Harm Risk Interventions:  [place] to call system. Call bell, personal items and telephone within reach. Instruct patient to call for assistance. Room bathroom lighting operational. Non-slip footwear when patient is off stretcher. Physically safe environment: no spills, clutter or unnecessary equipment. Stretcher in lowest position, wheels locked, appropriate side rails in place. Provide visual cue, wrist band, yellow gown, etc. Monitor gait and stability. Monitor for mental status changes and reorient to person, place, and time. Review medications for side effects contributing to fall risk. Reinforce activity limits and safety measures with patient and family. Provide visual clues: red socks.

## 2022-04-23 NOTE — ED PROVIDER NOTE - CLINICAL SUMMARY MEDICAL DECISION MAKING FREE TEXT BOX
Bp is improved, pt feels better with resolved Sx and is neuro intact. Suspect Sx due to htn. pt and son are agreeable with dc, bp meds and pmd f/u

## 2022-06-04 ENCOUNTER — TELEPHONE ENCOUNTER (OUTPATIENT)
Dept: URBAN - METROPOLITAN AREA CLINIC 68 | Facility: CLINIC | Age: 87
End: 2022-06-04

## 2022-06-04 RX ORDER — LUBIPROSTONE 24 UG/1
CAPSULE, GELATIN COATED ORAL
Qty: 15 | Refills: 0 | OUTPATIENT
Start: 2012-11-14 | End: 2012-11-28

## 2022-06-04 RX ORDER — PEPPERMINT OIL
OIL (ML) MISCELLANEOUS
Qty: 90 | Refills: 90 | OUTPATIENT
Start: 2012-09-24 | End: 2012-11-21

## 2022-06-04 RX ORDER — LUBIPROSTONE 24 UG/1
CAPSULE, GELATIN COATED ORAL AS NEEDED
Qty: 30 | Refills: 30 | OUTPATIENT
Start: 2012-11-28 | End: 2013-02-20

## 2022-06-04 RX ORDER — POLYETHYLENE GLYCOL 3350 17 G/DOSE
POWDER (GRAM) ORAL
Qty: 1 | Refills: 0 | OUTPATIENT
Start: 2012-11-14 | End: 2012-11-21

## 2022-06-05 ENCOUNTER — TELEPHONE ENCOUNTER (OUTPATIENT)
Dept: URBAN - METROPOLITAN AREA CLINIC 68 | Facility: CLINIC | Age: 87
End: 2022-06-05

## 2022-06-05 RX ORDER — LORAZEPAM 0.5 MG/1
LORAZEPAM( 0.5MG ORAL  DAILY ) ACTIVE -HX ENTRY TABLET ORAL DAILY
Status: ACTIVE | COMMUNITY
Start: 2013-03-20

## 2022-06-05 RX ORDER — GABAPENTIN 300 MG/1
GABAPENTIN( 300MG ORAL  AT BEDTIME ) ACTIVE -HX ENTRY CAPSULE ORAL AT BEDTIME
Status: ACTIVE | COMMUNITY
Start: 2013-03-20

## 2022-06-05 RX ORDER — LEVOTHYROXINE SODIUM 50 UG/1
SYNTHROID( 50MCG ORAL  DAILY ) ACTIVE -HX ENTRY TABLET ORAL DAILY
Status: ACTIVE | COMMUNITY
Start: 2013-03-20

## 2022-06-05 RX ORDER — VENLAFAXINE HYDROCHLORIDE 75 MG/1
EFFEXOR XR( 75MG ORAL  DAILY ) ACTIVE -HX ENTRY CAPSULE, EXTENDED RELEASE ORAL DAILY
Status: ACTIVE | COMMUNITY
Start: 2013-03-20

## 2022-06-05 RX ORDER — OMEPRAZOLE 20 MG/1
CAPSULE, DELAYED RELEASE ORAL
Qty: 60 | Refills: 60 | Status: ACTIVE | COMMUNITY
Start: 2013-03-20

## 2022-06-05 RX ORDER — LOSARTAN POTASSIUM 50 MG/1
LOSARTAN POTASSIUM( 50MG ORAL  DAILY ) ACTIVE -HX ENTRY TABLET ORAL DAILY
Status: ACTIVE | COMMUNITY
Start: 2013-03-20

## 2022-06-25 ENCOUNTER — TELEPHONE ENCOUNTER (OUTPATIENT)
Age: 87
End: 2022-06-25

## 2022-06-25 RX ORDER — LORATADINE 5 MG
TABLET,CHEWABLE ORAL
Qty: 1 | Refills: 0 | OUTPATIENT
Start: 2012-11-14 | End: 2012-11-21

## 2022-06-25 RX ORDER — LUBIPROSTONE 24 UG/1
CAPSULE, GELATIN COATED ORAL
Qty: 15 | Refills: 0 | OUTPATIENT
Start: 2012-11-14 | End: 2012-11-28

## 2022-06-25 RX ORDER — PEPPERMINT OIL
OIL (ML) MISCELLANEOUS
Qty: 90 | Refills: 90 | OUTPATIENT
Start: 2012-09-24 | End: 2012-11-21

## 2022-06-25 RX ORDER — LUBIPROSTONE 24 UG/1
CAPSULE, GELATIN COATED ORAL AS NEEDED
Qty: 30 | Refills: 30 | OUTPATIENT
Start: 2012-11-28 | End: 2013-02-20

## 2022-06-26 ENCOUNTER — TELEPHONE ENCOUNTER (OUTPATIENT)
Age: 87
End: 2022-06-26

## 2022-06-26 RX ORDER — ERGOCALCIFEROL 1.25 MG/1
VITAMIN D( 400UNIT ORAL  DAILY ) ACTIVE -HX ENTRY CAPSULE ORAL DAILY
Status: ACTIVE | COMMUNITY
Start: 2013-03-20

## 2022-06-26 RX ORDER — LORAZEPAM 0.5 MG/1
LORAZEPAM( 0.5MG ORAL  DAILY ) ACTIVE -HX ENTRY TABLET ORAL DAILY
Status: ACTIVE | COMMUNITY
Start: 2013-03-20

## 2022-06-26 RX ORDER — FAMOTIDINE 10 MG
TABLET ORAL
Qty: 60 | Refills: 60 | Status: ACTIVE | COMMUNITY
Start: 2013-02-20

## 2022-06-26 RX ORDER — OMEPRAZOLE 20 MG/1
CAPSULE, DELAYED RELEASE ORAL
Qty: 60 | Refills: 60 | Status: ACTIVE | COMMUNITY
Start: 2013-03-20

## 2022-06-26 RX ORDER — GABAPENTIN 300 MG/1
GABAPENTIN( 300MG ORAL  AT BEDTIME ) ACTIVE -HX ENTRY CAPSULE ORAL AT BEDTIME
Status: ACTIVE | COMMUNITY
Start: 2013-03-20

## 2022-06-26 RX ORDER — LEVOTHYROXINE SODIUM 50 UG/1
SYNTHROID( 50MCG ORAL  DAILY ) ACTIVE -HX ENTRY TABLET ORAL DAILY
Status: ACTIVE | COMMUNITY
Start: 2013-03-20

## 2022-06-26 RX ORDER — LOSARTAN POTASSIUM 50 MG/1
LOSARTAN POTASSIUM( 50MG ORAL  DAILY ) ACTIVE -HX ENTRY TABLET, FILM COATED ORAL DAILY
Status: ACTIVE | COMMUNITY
Start: 2013-03-20

## 2022-10-31 NOTE — ED PROVIDER NOTE - BIRTH SEX
Vaccine Information Statement(s) or the Emergency Use Authorization was given today. This has been reviewed, questions answered, and verbal consent given by Patient for injection(s) and administration of Influenza (Inactivated).        Patient tolerated without incident. See immunization grid for documentation.   Female

## 2023-02-28 ENCOUNTER — OFFICE (OUTPATIENT)
Dept: URBAN - METROPOLITAN AREA CLINIC 115 | Facility: CLINIC | Age: 88
Setting detail: OPHTHALMOLOGY
End: 2023-02-28
Payer: MEDICARE

## 2023-02-28 DIAGNOSIS — H35.363: ICD-10-CM

## 2023-02-28 DIAGNOSIS — H01.011: ICD-10-CM

## 2023-02-28 DIAGNOSIS — H01.014: ICD-10-CM

## 2023-02-28 DIAGNOSIS — H43.812: ICD-10-CM

## 2023-02-28 DIAGNOSIS — H01.015: ICD-10-CM

## 2023-02-28 DIAGNOSIS — H01.012: ICD-10-CM

## 2023-02-28 PROBLEM — H52.7 REFRACTIVE ERROR: Status: ACTIVE | Noted: 2023-02-28

## 2023-02-28 PROCEDURE — 92134 CPTRZ OPH DX IMG PST SGM RTA: CPT | Performed by: OPHTHALMOLOGY

## 2023-02-28 PROCEDURE — 92014 COMPRE OPH EXAM EST PT 1/>: CPT | Performed by: OPHTHALMOLOGY

## 2023-02-28 ASSESSMENT — REFRACTION_CURRENTRX
OS_OVR_VA: 20/
OD_SPHERE: +1.00
OS_SPHERE: +0.75
OD_AXIS: 104
OD_VPRISM_DIRECTION: SV
OD_CYLINDER: -1.00
OD_CYLINDER: -1.25
OD_SPHERE: +0.25
OD_OVR_VA: 20/
OS_AXIS: 066
OS_VPRISM_DIRECTION: SV
OS_VPRISM_DIRECTION: PROGS
OS_ADD: +2.50
OS_SPHERE: +0.25
OD_AXIS: 085
OS_ADD: +2.25
OS_ADD: +2.50
OS_OVR_VA: 20/
OD_AXIS: 100
OD_ADD: +2.50
OS_VPRISM_DIRECTION: PROGS
OD_OVR_VA: 20/
OS_OVR_VA: 20/
OS_CYLINDER: -1.25
OD_CYLINDER: -1.75
OD_SPHERE: PLANO
OD_VPRISM_DIRECTION: PROGS
OS_AXIS: 079
OD_OVR_VA: 20/
OD_ADD: +2.50
OS_CYLINDER: -1.25
OD_ADD: +2.25
OS_CYLINDER: -1.25
OS_SPHERE: +0.75
OS_AXIS: 72
OD_VPRISM_DIRECTION: PROGS

## 2023-02-28 ASSESSMENT — REFRACTION_MANIFEST
OD_CYLINDER: -1.00
OD_AXIS: 080
OS_AXIS: 120
OD_CYLINDER: -0.50
OD_SPHERE: PL
OD_SPHERE: +0.25
OS_CYLINDER: -0.75
OD_SPHERE: -0.75
OS_VA1: 20/25-
OS_ADD: +2.50
OD_VA1: 20/25+1
OD_ADD: +2.50
OD_VA1: 20/40+
OS_SPHERE: +0.50
OS_AXIS: 070
OD_AXIS: 060
OS_SPHERE: PL
OS_VA1: 20/40-1
OS_VA1: 20/40+
OS_CYLINDER: SPH
OS_ADD: +2.50
OD_ADD: +2.50
OS_ADD: +2.50
OD_VA1: 20/40-2
OS_SPHERE: -0.50
OS_CYLINDER: -1.25
OD_CYLINDER: -1.00
OD_AXIS: 095
OD_ADD: +2.50
OU_VA: 20/40-1

## 2023-02-28 ASSESSMENT — REFRACTION_AUTOREFRACTION
OD_SPHERE: -0.75
OS_CYLINDER: -0.25
OD_CYLINDER: -0.50
OS_AXIS: 119
OD_AXIS: 078
OS_SPHERE: -1.25

## 2023-02-28 ASSESSMENT — CONFRONTATIONAL VISUAL FIELD TEST (CVF)
OS_FINDINGS: FULL
OD_FINDINGS: FULL

## 2023-02-28 ASSESSMENT — LID EXAM ASSESSMENTS
OS_BLEPHARITIS: LLL LUL 1+
OD_BLEPHARITIS: RLL RUL 1+

## 2023-02-28 ASSESSMENT — SPHEQUIV_DERIVED
OD_SPHEQUIV: -1
OD_SPHEQUIV: -0.25
OD_SPHEQUIV: -1
OS_SPHEQUIV: -0.125
OS_SPHEQUIV: -0.875
OS_SPHEQUIV: -1.375

## 2023-02-28 ASSESSMENT — VISUAL ACUITY
OS_BCVA: 20/40-1
OD_BCVA: 20/50-1

## 2023-05-18 ENCOUNTER — OUTPATIENT (OUTPATIENT)
Dept: OUTPATIENT SERVICES | Facility: HOSPITAL | Age: 88
LOS: 1 days | End: 2023-05-18
Payer: MEDICARE

## 2023-05-18 ENCOUNTER — APPOINTMENT (OUTPATIENT)
Dept: MRI IMAGING | Facility: CLINIC | Age: 88
End: 2023-05-18
Payer: MEDICARE

## 2023-05-18 DIAGNOSIS — Z00.8 ENCOUNTER FOR OTHER GENERAL EXAMINATION: ICD-10-CM

## 2023-05-18 PROCEDURE — 72148 MRI LUMBAR SPINE W/O DYE: CPT | Mod: 26,MH

## 2023-05-18 PROCEDURE — 72148 MRI LUMBAR SPINE W/O DYE: CPT | Mod: MH

## 2023-06-26 ENCOUNTER — TRANSCRIPTION ENCOUNTER (OUTPATIENT)
Age: 88
End: 2023-06-26

## 2023-06-27 ENCOUNTER — OUTPATIENT (OUTPATIENT)
Dept: OUTPATIENT SERVICES | Facility: HOSPITAL | Age: 88
LOS: 1 days | End: 2023-06-27
Payer: MEDICARE

## 2023-06-27 DIAGNOSIS — M53.3 SACROCOCCYGEAL DISORDERS, NOT ELSEWHERE CLASSIFIED: ICD-10-CM

## 2023-06-27 PROCEDURE — 76000 FLUOROSCOPY <1 HR PHYS/QHP: CPT

## 2023-06-27 PROCEDURE — 62323 NJX INTERLAMINAR LMBR/SAC: CPT

## 2023-07-20 NOTE — ED ADULT NURSE NOTE - IS THE PATIENT ABLE TO BE SCREENED?
no lesions, no deformities, no traumatic injuries, no significant scars are present, chest wall non-tender, no masses present, breathing is unlabored without accessory muscle use,normal breath sounds
Yes

## 2024-04-28 NOTE — ED PROVIDER NOTE - CROS ED RESP ALL NEG
(1) Drift; limb holds 90 (or 45) degrees, but drifts down before full 10 seconds; does not hit bed or other support
negative...

## 2025-01-28 ENCOUNTER — OFFICE (OUTPATIENT)
Dept: URBAN - METROPOLITAN AREA CLINIC 115 | Facility: CLINIC | Age: 89
Setting detail: OPHTHALMOLOGY
End: 2025-01-28
Payer: MEDICARE

## 2025-01-28 DIAGNOSIS — H01.011: ICD-10-CM

## 2025-01-28 DIAGNOSIS — H01.014: ICD-10-CM

## 2025-01-28 DIAGNOSIS — H01.015: ICD-10-CM

## 2025-01-28 DIAGNOSIS — H01.012: ICD-10-CM

## 2025-01-28 DIAGNOSIS — H43.812: ICD-10-CM

## 2025-01-28 DIAGNOSIS — Z96.1: ICD-10-CM

## 2025-01-28 DIAGNOSIS — H35.363: ICD-10-CM

## 2025-01-28 DIAGNOSIS — H52.7: ICD-10-CM

## 2025-01-28 PROCEDURE — 92134 CPTRZ OPH DX IMG PST SGM RTA: CPT | Performed by: OPHTHALMOLOGY

## 2025-01-28 PROCEDURE — 92014 COMPRE OPH EXAM EST PT 1/>: CPT | Performed by: OPHTHALMOLOGY

## 2025-01-28 ASSESSMENT — REFRACTION_CURRENTRX
OS_CYLINDER: -0.75
OS_VPRISM_DIRECTION: PROGS
OD_VPRISM_DIRECTION: PROGS
OD_ADD: +2.50
OS_CYLINDER: -1.25
OS_VPRISM_DIRECTION: PROGS
OS_OVR_VA: 20/
OD_CYLINDER: -0.75
OS_ADD: +2.25
OS_CYLINDER: -1.25
OD_VPRISM_DIRECTION: SV
OD_OVR_VA: 20/
OS_VPRISM_DIRECTION: SV
OS_OVR_VA: 20/
OD_CYLINDER: -1.75
OD_AXIS: 087
OS_VPRISM_DIRECTION: PROGS
OD_CYLINDER: -1.00
OD_ADD: +2.25
OS_AXIS: 066
OS_ADD: +2.25
OS_AXIS: 122
OD_AXIS: 100
OS_SPHERE: +0.25
OD_OVR_VA: 20/
OD_CYLINDER: -1.25
OS_AXIS: 72
OS_AXIS: 079
OD_VPRISM_DIRECTION: PROGS
OS_ADD: +2.50
OS_ADD: +2.50
OS_SPHERE: +0.75
OD_AXIS: 104
OD_SPHERE: +0.25
OD_SPHERE: PLANO
OD_ADD: +2.25
OS_OVR_VA: 20/
OD_AXIS: 085
OD_SPHERE: +1.00
OD_VPRISM_DIRECTION: PROGS
OS_SPHERE: -0.50
OS_CYLINDER: -1.25
OD_OVR_VA: 20/
OS_SPHERE: +0.75
OD_ADD: +2.50
OD_SPHERE: +0.25

## 2025-01-28 ASSESSMENT — REFRACTION_MANIFEST
OS_CYLINDER: SPH
OD_SPHERE: +0.25
OD_AXIS: 080
OD_AXIS: 080
OD_VA1: 20/40+
OS_AXIS: 120
OS_CYLINDER: -0.75
OD_CYLINDER: -1.00
OS_CYLINDER: -1.25
OD_ADD: +2.50
OS_VA1: 20/40+
OS_ADD: +2.50
OD_CYLINDER: -1.00
OS_VA1: 20/40-
OD_ADD: +2.50
OS_AXIS: 070
OD_VA1: 20/25+1
OD_CYLINDER: -0.75
OU_VA: 20/40-1
OS_SPHERE: +0.50
OS_CYLINDER: -0.75
OS_SPHERE: -0.50
OS_ADD: +2.50
OD_VA1: 20/40-2
OD_SPHERE: PL
OD_SPHERE: -0.75
OU_VA: 20/40
OD_ADD: +2.50
OS_SPHERE: -1.00
OS_VA1: 20/25-
OS_ADD: +2.50
OS_ADD: +2.50
OD_AXIS: 095
OD_CYLINDER: -0.50
OS_SPHERE: PL
OS_VA1: 20/40-1
OS_AXIS: 085
OD_ADD: +2.50
OD_SPHERE: PLANO
OD_AXIS: 060

## 2025-01-28 ASSESSMENT — REFRACTION_AUTOREFRACTION
OD_CYLINDER: -1.75
OS_CYLINDER: -1.50
OD_AXIS: 079
OS_AXIS: 071
OD_SPHERE: -0.50
OS_SPHERE: -0.25

## 2025-01-28 ASSESSMENT — VISUAL ACUITY
OS_BCVA: 20/50
OD_BCVA: 20/50-

## 2025-01-28 ASSESSMENT — CONFRONTATIONAL VISUAL FIELD TEST (CVF)
OS_FINDINGS: FULL
OD_FINDINGS: FULL

## 2025-01-28 ASSESSMENT — LID EXAM ASSESSMENTS
OS_BLEPHARITIS: LLL LUL 1+
OD_BLEPHARITIS: RLL RUL 1+

## 2025-01-28 ASSESSMENT — TONOMETRY
OD_IOP_MMHG: 13
OS_IOP_MMHG: 10

## 2025-05-16 ENCOUNTER — EMERGENCY (EMERGENCY)
Facility: HOSPITAL | Age: 89
LOS: 1 days | End: 2025-05-16
Attending: STUDENT IN AN ORGANIZED HEALTH CARE EDUCATION/TRAINING PROGRAM
Payer: MEDICARE

## 2025-05-16 VITALS
HEART RATE: 72 BPM | DIASTOLIC BLOOD PRESSURE: 73 MMHG | TEMPERATURE: 98 F | OXYGEN SATURATION: 97 % | SYSTOLIC BLOOD PRESSURE: 118 MMHG | RESPIRATION RATE: 18 BRPM

## 2025-05-16 VITALS
HEART RATE: 111 BPM | SYSTOLIC BLOOD PRESSURE: 113 MMHG | WEIGHT: 148.59 LBS | DIASTOLIC BLOOD PRESSURE: 70 MMHG | OXYGEN SATURATION: 98 % | HEIGHT: 62 IN | TEMPERATURE: 98 F | RESPIRATION RATE: 18 BRPM

## 2025-05-16 LAB
ALBUMIN SERPL ELPH-MCNC: 3.5 G/DL — SIGNIFICANT CHANGE UP (ref 3.3–5.2)
ALP SERPL-CCNC: 62 U/L — SIGNIFICANT CHANGE UP (ref 40–120)
ALT FLD-CCNC: 45 U/L — HIGH
ANION GAP SERPL CALC-SCNC: 15 MMOL/L — SIGNIFICANT CHANGE UP (ref 5–17)
APPEARANCE UR: CLEAR — SIGNIFICANT CHANGE UP
AST SERPL-CCNC: 40 U/L — HIGH
BACTERIA # UR AUTO: NEGATIVE /HPF — SIGNIFICANT CHANGE UP
BASOPHILS # BLD AUTO: 0.11 K/UL — SIGNIFICANT CHANGE UP (ref 0–0.2)
BASOPHILS # BLD MANUAL: 0.12 K/UL — SIGNIFICANT CHANGE UP (ref 0–0.2)
BASOPHILS NFR BLD AUTO: 0.8 % — SIGNIFICANT CHANGE UP (ref 0–2)
BASOPHILS NFR BLD MANUAL: 0.9 % — SIGNIFICANT CHANGE UP (ref 0–2)
BILIRUB SERPL-MCNC: 0.3 MG/DL — LOW (ref 0.4–2)
BILIRUB UR-MCNC: NEGATIVE — SIGNIFICANT CHANGE UP
BUN SERPL-MCNC: 31.3 MG/DL — HIGH (ref 8–20)
CALCIUM SERPL-MCNC: 8.9 MG/DL — SIGNIFICANT CHANGE UP (ref 8.4–10.5)
CAST: 2 /LPF — SIGNIFICANT CHANGE UP (ref 0–4)
CHLORIDE SERPL-SCNC: 101 MMOL/L — SIGNIFICANT CHANGE UP (ref 96–108)
CO2 SERPL-SCNC: 23 MMOL/L — SIGNIFICANT CHANGE UP (ref 22–29)
COLOR SPEC: YELLOW — SIGNIFICANT CHANGE UP
CREAT SERPL-MCNC: 1.45 MG/DL — HIGH (ref 0.5–1.3)
DIFF PNL FLD: NEGATIVE — SIGNIFICANT CHANGE UP
EGFR: 35 ML/MIN/1.73M2 — LOW
EGFR: 35 ML/MIN/1.73M2 — LOW
EOSINOPHIL # BLD AUTO: 0.2 K/UL — SIGNIFICANT CHANGE UP (ref 0–0.5)
EOSINOPHIL # BLD MANUAL: 0.22 K/UL — SIGNIFICANT CHANGE UP (ref 0–0.5)
EOSINOPHIL NFR BLD AUTO: 1.5 % — SIGNIFICANT CHANGE UP (ref 0–6)
EOSINOPHIL NFR BLD MANUAL: 1.7 % — SIGNIFICANT CHANGE UP (ref 0–6)
GLUCOSE SERPL-MCNC: 126 MG/DL — HIGH (ref 70–99)
GLUCOSE UR QL: NEGATIVE MG/DL — SIGNIFICANT CHANGE UP
HCT VFR BLD CALC: 40.3 % — SIGNIFICANT CHANGE UP (ref 34.5–45)
HGB BLD-MCNC: 13.1 G/DL — SIGNIFICANT CHANGE UP (ref 11.5–15.5)
IMM GRANULOCYTES # BLD AUTO: 0.91 K/UL — HIGH (ref 0–0.07)
IMM GRANULOCYTES NFR BLD AUTO: 6.9 % — HIGH (ref 0–0.9)
KETONES UR QL: ABNORMAL MG/DL
LEUKOCYTE ESTERASE UR-ACNC: ABNORMAL
LYMPHOCYTES # BLD AUTO: 3.86 K/UL — HIGH (ref 1–3.3)
LYMPHOCYTES # BLD MANUAL: 3.37 K/UL — HIGH (ref 1–3.3)
LYMPHOCYTES NFR BLD AUTO: 29.3 % — SIGNIFICANT CHANGE UP (ref 13–44)
LYMPHOCYTES NFR BLD MANUAL: 25.6 % — SIGNIFICANT CHANGE UP (ref 13–44)
MACROCYTES BLD QL: SLIGHT — SIGNIFICANT CHANGE UP
MANUAL METAMYELOCYTE #: 0.34 K/UL — HIGH (ref 0–0)
MANUAL MYELOCYTE #: 0.22 K/UL — HIGH (ref 0–0)
MANUAL NEUTROPHIL BANDS #: 0.12 K/UL — SIGNIFICANT CHANGE UP (ref 0–0.84)
MCHC RBC-ENTMCNC: 30 PG — SIGNIFICANT CHANGE UP (ref 27–34)
MCHC RBC-ENTMCNC: 32.5 G/DL — SIGNIFICANT CHANGE UP (ref 32–36)
MCV RBC AUTO: 92.2 FL — SIGNIFICANT CHANGE UP (ref 80–100)
METAMYELOCYTES # FLD: 2.6 % — HIGH (ref 0–0)
METAMYELOCYTES NFR BLD: 2.6 % — HIGH (ref 0–0)
MONOCYTES # BLD AUTO: 1.22 K/UL — HIGH (ref 0–0.9)
MONOCYTES # BLD MANUAL: 0.79 K/UL — SIGNIFICANT CHANGE UP (ref 0–0.9)
MONOCYTES NFR BLD AUTO: 9.3 % — SIGNIFICANT CHANGE UP (ref 2–14)
MONOCYTES NFR BLD MANUAL: 6 % — SIGNIFICANT CHANGE UP (ref 2–14)
MYELOCYTES NFR BLD: 1.7 % — HIGH (ref 0–0)
NEUTROPHILS # BLD AUTO: 6.88 K/UL — SIGNIFICANT CHANGE UP (ref 1.8–7.4)
NEUTROPHILS # BLD MANUAL: 7.99 K/UL — HIGH (ref 1.8–7.4)
NEUTROPHILS NFR BLD AUTO: 52.2 % — SIGNIFICANT CHANGE UP (ref 43–77)
NEUTROPHILS NFR BLD MANUAL: 60.6 % — SIGNIFICANT CHANGE UP (ref 43–77)
NEUTS BAND # BLD: 0.9 % — SIGNIFICANT CHANGE UP (ref 0–8)
NEUTS BAND NFR BLD: 0.9 % — SIGNIFICANT CHANGE UP (ref 0–8)
NITRITE UR-MCNC: NEGATIVE — SIGNIFICANT CHANGE UP
NRBC # BLD AUTO: 0 K/UL — SIGNIFICANT CHANGE UP (ref 0–0)
NRBC # FLD: 0 K/UL — SIGNIFICANT CHANGE UP (ref 0–0)
NRBC BLD AUTO-RTO: 0 /100 WBCS — SIGNIFICANT CHANGE UP (ref 0–0)
OVALOCYTES BLD QL SMEAR: SLIGHT — SIGNIFICANT CHANGE UP
PH UR: 6 — SIGNIFICANT CHANGE UP (ref 5–8)
PLAT MORPH BLD: NORMAL — SIGNIFICANT CHANGE UP
PLATELET # BLD AUTO: 330 K/UL — SIGNIFICANT CHANGE UP (ref 150–400)
PLATELET COUNT - ESTIMATE: NORMAL — SIGNIFICANT CHANGE UP
PMV BLD: 10.1 FL — SIGNIFICANT CHANGE UP (ref 7–13)
POLYCHROMASIA BLD QL SMEAR: SLIGHT — SIGNIFICANT CHANGE UP
POTASSIUM SERPL-MCNC: 4.5 MMOL/L — SIGNIFICANT CHANGE UP (ref 3.5–5.3)
POTASSIUM SERPL-SCNC: 4.5 MMOL/L — SIGNIFICANT CHANGE UP (ref 3.5–5.3)
PROT SERPL-MCNC: 6.7 G/DL — SIGNIFICANT CHANGE UP (ref 6.6–8.7)
PROT UR-MCNC: SIGNIFICANT CHANGE UP MG/DL
RAPID RVP RESULT: DETECTED
RBC # BLD: 4.37 M/UL — SIGNIFICANT CHANGE UP (ref 3.8–5.2)
RBC # FLD: 12.2 % — SIGNIFICANT CHANGE UP (ref 10.3–14.5)
RBC BLD AUTO: ABNORMAL
RBC CASTS # UR COMP ASSIST: 0 /HPF — SIGNIFICANT CHANGE UP (ref 0–4)
SARS-COV-2 RNA SPEC QL NAA+PROBE: DETECTED
SODIUM SERPL-SCNC: 139 MMOL/L — SIGNIFICANT CHANGE UP (ref 135–145)
SP GR SPEC: 1.02 — SIGNIFICANT CHANGE UP (ref 1–1.03)
SQUAMOUS # UR AUTO: 6 /HPF — HIGH (ref 0–5)
UROBILINOGEN FLD QL: 1 MG/DL — SIGNIFICANT CHANGE UP (ref 0.2–1)
WBC # BLD: 13.18 K/UL — HIGH (ref 3.8–10.5)
WBC # FLD AUTO: 13.18 K/UL — HIGH (ref 3.8–10.5)
WBC UR QL: 5 /HPF — SIGNIFICANT CHANGE UP (ref 0–5)

## 2025-05-16 PROCEDURE — 71046 X-RAY EXAM CHEST 2 VIEWS: CPT | Mod: 26

## 2025-05-16 PROCEDURE — 81001 URINALYSIS AUTO W/SCOPE: CPT

## 2025-05-16 PROCEDURE — 71046 X-RAY EXAM CHEST 2 VIEWS: CPT

## 2025-05-16 PROCEDURE — 80053 COMPREHEN METABOLIC PANEL: CPT

## 2025-05-16 PROCEDURE — 36415 COLL VENOUS BLD VENIPUNCTURE: CPT

## 2025-05-16 PROCEDURE — 85025 COMPLETE CBC W/AUTO DIFF WBC: CPT

## 2025-05-16 PROCEDURE — 87086 URINE CULTURE/COLONY COUNT: CPT

## 2025-05-16 PROCEDURE — 99284 EMERGENCY DEPT VISIT MOD MDM: CPT

## 2025-05-16 PROCEDURE — 0225U NFCT DS DNA&RNA 21 SARSCOV2: CPT

## 2025-05-16 PROCEDURE — 99284 EMERGENCY DEPT VISIT MOD MDM: CPT | Mod: 25

## 2025-05-16 PROCEDURE — 96374 THER/PROPH/DIAG INJ IV PUSH: CPT

## 2025-05-16 RX ORDER — ACETAMINOPHEN 500 MG/5ML
1000 LIQUID (ML) ORAL ONCE
Refills: 0 | Status: COMPLETED | OUTPATIENT
Start: 2025-05-16 | End: 2025-05-16

## 2025-05-16 RX ADMIN — Medication 1000 MILLILITER(S): at 10:45

## 2025-05-16 RX ADMIN — Medication 400 MILLIGRAM(S): at 10:45

## 2025-05-16 NOTE — ED ADULT TRIAGE NOTE - CHIEF COMPLAINT QUOTE
"had covid. took medicines and made me better but getting worse now. hot and cold. bloated. tremors. feel weak."

## 2025-05-16 NOTE — ED PROVIDER NOTE - CLINICAL SUMMARY MEDICAL DECISION MAKING FREE TEXT BOX
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  Patient complaining of flulike symptoms/weakness.  Patient recently had diagnosis of COVID was on Paxlovid finished Paxlovid approximately 5 to 7 days ago had a CT chest that was -1 to 2 weeks ago.  At the son in the room.  Patient has no chest pain or shortness of breath no nausea no vomiting but just feels weak and lethargic.    GENERAL: Awake, alert, NAD  HEENT: NC/AT, moist mucous membranes, PERRL, EOMI  LUNGS: CTAB, no wheezes or crackles   ABDOMEN: Soft, , non tender, non distended, no rebound, no guarding  BACK: No midline spinal tenderness, no CVA tenderness  EXT: No edema, no calf tenderness no deformities.  NEURO: A&Ox3. Moving all extremities.  SKIN: Warm and dry. No rash.  PSYCH: Normal affect.     given hx and pe plan for labs imaging, pain control fluids, possible rebound infection, vs pna, vs uti, vs dehydration dispo pending results

## 2025-05-16 NOTE — ED ADULT NURSE NOTE - OBJECTIVE STATEMENT
88 YO female presented to the Ed with c/o increasing weakness, hot flashes, tremors and back pain since 1 week. patient was recently diagnosed with COVID, completed a course of Paxlovid 3-5 days ago. Had a CT chest performed 1 week ago. Denies chest pain, SOB, nausea, vomiting, abdominal pain, PMH of " polio in left leg", HTN. On Lyrica. Patient c/o low back pain. Due to weakness patient able to ambulate with 1 assist. Blood work sent, medications given asper orders, awaiting CXR.

## 2025-05-16 NOTE — ED PROVIDER NOTE - NSFOLLOWUPINSTRUCTIONS_ED_ALL_ED_FT
Weakness is a lack of strength. You may feel weak all over your body (generalized), or you may feel weak in one part of your body (focal). Common causes of weakness include:  Infection and disorders of the body's defense system (immune system).  Physical exhaustion.  Internal bleeding or other blood loss that results in a lack of red blood cells (anemia).  Dehydration.  An imbalance in mineral (electrolyte) levels, such as potassium.  Chronic kidney or liver disease.  Cancer.  Other causes include:  Some medicines or cancer treatment.  Stress, anxiety, or depression.  Heart disease, circulation problems, or stroke.  Nervous system disorders.  Thyroid disorders.  Loss of muscle strength because of age or inactivity.  Poor sleep quality or sleep disorders.  The cause of your weakness may not be known. Some causes of weakness can be serious, so it is important to see your health care provider.    Follow these instructions at home:  Activity    Rest as needed.  Try to get enough sleep. Most adults need 7–8 hours of quality sleep each night. Talk to your health care provider about how much sleep you need.  Do exercises, such as arm curls and leg raises, for 30 minutes at least 2 days a week or as told by your health care provider. This helps build muscle strength.  Consider working with a physical therapist or  who can develop an exercise plan to help you gain muscle strength.  General instructions    A plate with examples of foods in a healthy diet.  Take over-the-counter and prescription medicines only as told by your health care provider.  Eat a healthy, well-balanced diet. This includes:  Proteins to build muscles, such as lean meats and fish.  Fresh fruits and vegetables.  Carbohydrates to boost energy, such as whole grains.  Drink enough fluid to keep your urine pale yellow.  Keep all follow-up visits. This is important.  Contact a health care provider if:  Your weakness does not improve or gets worse.  Your weakness affects your ability to think clearly.  Your weakness affects your ability to do your normal daily activities.  Get help right away if:  You develop sudden weakness, especially on one side of your face or body.  You have chest pain.  You have trouble breathing or shortness of breath.  You have problems with your vision.  You have trouble talking or swallowing.  You have trouble standing or walking.  You are light-headed or lose consciousness.  These symptoms may be an emergency. Get help right away. Call 911.  Do not wait to see if the symptoms will go away.  Do not drive yourself to the hospital.  Summary  Weakness is a lack of strength. You may feel weak all over your body or just in one specific part of your body.  Weakness can be caused by a variety of things. In some cases, the cause may be unknown.  Rest as needed, and try to get enough sleep. Most adults need 7–8 hours of quality sleep each night.  Eat a healthy, well-balanced diet.  This information is not intended to replace advice given to you by your health care provider. Make sure you discuss any questions you have with your health care provider.

## 2025-05-16 NOTE — ED ADULT NURSE NOTE - NSFALLHARMRISKINTERV_ED_ALL_ED

## 2025-05-16 NOTE — ED PROVIDER NOTE - PATIENT PORTAL LINK FT
You can access the FollowMyHealth Patient Portal offered by [facility] by registering at the following website: [email or web address]. By joining [employer]’s FollowMyHealth portal, you will also be able to view your health information using other applications (apps) compatible with our system.

## 2025-05-16 NOTE — ED ADULT TRIAGE NOTE - PAIN RATING/NUMBER SCALE (0-10): ACTIVITY
2 (mild pain)
Learning behavioral activities to cope with urges.  For example, distraction and changing routines

## 2025-05-16 NOTE — ED PROVIDER NOTE - PROGRESS NOTE DETAILS
Hayden ATTG   Went over results with patient no clear etiology chest x-ray appears clear.    plan for dc w/ pcp fu son at bedside will fu rvp outpt

## 2025-05-16 NOTE — ED ADULT NURSE NOTE - CAS TRG GENERAL AIRWAY, MLM
Received report, assumed pt care. Pt alert without any signs of distress, arley light within reach.    Patent

## 2025-05-17 LAB
CULTURE RESULTS: SIGNIFICANT CHANGE UP
SPECIMEN SOURCE: SIGNIFICANT CHANGE UP

## 2025-05-22 DIAGNOSIS — Z86.12 PERSONAL HISTORY OF POLIOMYELITIS: ICD-10-CM

## 2025-05-22 DIAGNOSIS — R53.1 WEAKNESS: ICD-10-CM

## 2025-05-22 DIAGNOSIS — Z88.5 ALLERGY STATUS TO NARCOTIC AGENT: ICD-10-CM

## 2025-05-22 DIAGNOSIS — U07.1 COVID-19: ICD-10-CM

## 2025-08-04 ENCOUNTER — EMERGENCY (EMERGENCY)
Facility: HOSPITAL | Age: 89
LOS: 1 days | End: 2025-08-04
Attending: EMERGENCY MEDICINE
Payer: MEDICARE

## 2025-08-04 VITALS
RESPIRATION RATE: 16 BRPM | WEIGHT: 149.47 LBS | HEART RATE: 84 BPM | DIASTOLIC BLOOD PRESSURE: 59 MMHG | OXYGEN SATURATION: 97 % | SYSTOLIC BLOOD PRESSURE: 125 MMHG | TEMPERATURE: 98 F

## 2025-08-04 PROCEDURE — 73010 X-RAY EXAM OF SHOULDER BLADE: CPT

## 2025-08-04 PROCEDURE — 99284 EMERGENCY DEPT VISIT MOD MDM: CPT | Mod: GC

## 2025-08-04 PROCEDURE — 73010 X-RAY EXAM OF SHOULDER BLADE: CPT | Mod: 26

## 2025-08-04 PROCEDURE — 99284 EMERGENCY DEPT VISIT MOD MDM: CPT

## 2025-08-04 PROCEDURE — 73030 X-RAY EXAM OF SHOULDER: CPT

## 2025-08-04 PROCEDURE — 73030 X-RAY EXAM OF SHOULDER: CPT | Mod: 26,RT

## 2025-08-04 RX ORDER — METHOCARBAMOL 500 MG/1
1 TABLET, FILM COATED ORAL
Qty: 15 | Refills: 0
Start: 2025-08-04 | End: 2025-08-08

## 2025-08-04 RX ORDER — ONDANSETRON HCL/PF 4 MG/2 ML
4 VIAL (ML) INJECTION ONCE
Refills: 0 | Status: COMPLETED | OUTPATIENT
Start: 2025-08-04 | End: 2025-08-04

## 2025-08-04 RX ORDER — ACETAMINOPHEN 500 MG/5ML
2 LIQUID (ML) ORAL
Qty: 40 | Refills: 0
Start: 2025-08-04 | End: 2025-08-08

## 2025-08-04 RX ORDER — LIDOCAINE HYDROCHLORIDE 20 MG/ML
1 JELLY TOPICAL
Qty: 1 | Refills: 0
Start: 2025-08-04 | End: 2025-08-08

## 2025-08-04 RX ORDER — LIDOCAINE HYDROCHLORIDE 20 MG/ML
1 JELLY TOPICAL ONCE
Refills: 0 | Status: COMPLETED | OUTPATIENT
Start: 2025-08-04 | End: 2025-08-04

## 2025-08-04 RX ORDER — METHOCARBAMOL 500 MG/1
1000 TABLET, FILM COATED ORAL ONCE
Refills: 0 | Status: COMPLETED | OUTPATIENT
Start: 2025-08-04 | End: 2025-08-04

## 2025-08-04 RX ORDER — ACETAMINOPHEN 500 MG/5ML
650 LIQUID (ML) ORAL ONCE
Refills: 0 | Status: COMPLETED | OUTPATIENT
Start: 2025-08-04 | End: 2025-08-04

## 2025-08-04 RX ORDER — METHOCARBAMOL 500 MG/1
2 TABLET, FILM COATED ORAL
Qty: 30 | Refills: 0
Start: 2025-08-04 | End: 2025-08-08

## 2025-08-04 RX ADMIN — LIDOCAINE HYDROCHLORIDE 1 PATCH: 20 JELLY TOPICAL at 14:46

## 2025-08-04 RX ADMIN — Medication 650 MILLIGRAM(S): at 14:46

## 2025-08-04 RX ADMIN — Medication 4 MILLIGRAM(S): at 14:46

## 2025-08-04 RX ADMIN — METHOCARBAMOL 1000 MILLIGRAM(S): 500 TABLET, FILM COATED ORAL at 14:46

## 2025-08-13 ENCOUNTER — APPOINTMENT (OUTPATIENT)
Dept: PULMONOLOGY | Facility: CLINIC | Age: 89
End: 2025-08-13
Payer: MEDICARE

## 2025-08-13 VITALS
DIASTOLIC BLOOD PRESSURE: 60 MMHG | OXYGEN SATURATION: 96 % | HEART RATE: 72 BPM | SYSTOLIC BLOOD PRESSURE: 122 MMHG | WEIGHT: 145 LBS | HEIGHT: 59 IN | BODY MASS INDEX: 29.23 KG/M2 | RESPIRATION RATE: 16 BRPM

## 2025-08-13 DIAGNOSIS — Z87.891 PERSONAL HISTORY OF NICOTINE DEPENDENCE: ICD-10-CM

## 2025-08-13 DIAGNOSIS — J84.9 INTERSTITIAL PULMONARY DISEASE, UNSPECIFIED: ICD-10-CM

## 2025-08-13 DIAGNOSIS — Z78.9 OTHER SPECIFIED HEALTH STATUS: ICD-10-CM

## 2025-08-13 DIAGNOSIS — R06.02 SHORTNESS OF BREATH: ICD-10-CM

## 2025-08-13 PROCEDURE — G2211 COMPLEX E/M VISIT ADD ON: CPT

## 2025-08-13 PROCEDURE — 99204 OFFICE O/P NEW MOD 45 MIN: CPT

## 2025-08-13 RX ORDER — ALBUTEROL SULFATE 90 UG/1
108 (90 BASE) INHALANT RESPIRATORY (INHALATION)
Qty: 1 | Refills: 5 | Status: ACTIVE | COMMUNITY
Start: 2025-08-13 | End: 1900-01-01

## 2025-08-13 RX ORDER — VENLAFAXINE HCL 75 MG
75 TABLET ORAL
Refills: 0 | Status: ACTIVE | COMMUNITY

## 2025-08-13 RX ORDER — LEVOTHYROXINE SODIUM 0.17 MG/1
TABLET ORAL
Refills: 0 | Status: ACTIVE | COMMUNITY

## 2025-08-13 RX ORDER — LOSARTAN POTASSIUM 100 MG/1
TABLET, FILM COATED ORAL
Refills: 0 | Status: ACTIVE | COMMUNITY

## 2025-08-13 RX ORDER — ATORVASTATIN CALCIUM 80 MG/1
TABLET, FILM COATED ORAL
Refills: 0 | Status: ACTIVE | COMMUNITY

## 2025-08-13 RX ORDER — PREGABALIN 75 MG/1
75 CAPSULE ORAL
Refills: 0 | Status: ACTIVE | COMMUNITY

## 2025-09-05 ENCOUNTER — LABORATORY RESULT (OUTPATIENT)
Age: 89
End: 2025-09-05

## 2025-09-12 ENCOUNTER — APPOINTMENT (OUTPATIENT)
Dept: PULMONOLOGY | Facility: CLINIC | Age: 89
End: 2025-09-12

## 2025-09-12 VITALS
HEART RATE: 85 BPM | OXYGEN SATURATION: 98 % | RESPIRATION RATE: 16 BRPM | DIASTOLIC BLOOD PRESSURE: 76 MMHG | SYSTOLIC BLOOD PRESSURE: 122 MMHG

## 2025-09-12 VITALS — WEIGHT: 142 LBS | HEIGHT: 61 IN | BODY MASS INDEX: 26.81 KG/M2

## 2025-09-12 VITALS — HEIGHT: 61 IN | BODY MASS INDEX: 26.81 KG/M2 | WEIGHT: 142 LBS

## 2025-09-12 DIAGNOSIS — J84.9 INTERSTITIAL PULMONARY DISEASE, UNSPECIFIED: ICD-10-CM

## 2025-09-12 DIAGNOSIS — R06.02 SHORTNESS OF BREATH: ICD-10-CM
